# Patient Record
Sex: FEMALE | Race: WHITE | NOT HISPANIC OR LATINO
[De-identification: names, ages, dates, MRNs, and addresses within clinical notes are randomized per-mention and may not be internally consistent; named-entity substitution may affect disease eponyms.]

---

## 2017-05-23 ENCOUNTER — APPOINTMENT (OUTPATIENT)
Dept: ULTRASOUND IMAGING | Facility: CLINIC | Age: 72
End: 2017-05-23

## 2017-05-23 ENCOUNTER — OUTPATIENT (OUTPATIENT)
Dept: OUTPATIENT SERVICES | Facility: HOSPITAL | Age: 72
LOS: 1 days | End: 2017-05-23
Payer: MEDICARE

## 2017-05-23 ENCOUNTER — APPOINTMENT (OUTPATIENT)
Dept: MAMMOGRAPHY | Facility: CLINIC | Age: 72
End: 2017-05-23

## 2017-05-23 DIAGNOSIS — Z00.8 ENCOUNTER FOR OTHER GENERAL EXAMINATION: ICD-10-CM

## 2017-05-23 PROCEDURE — 76641 ULTRASOUND BREAST COMPLETE: CPT

## 2017-05-23 PROCEDURE — 77067 SCR MAMMO BI INCL CAD: CPT

## 2017-05-23 PROCEDURE — 77063 BREAST TOMOSYNTHESIS BI: CPT

## 2017-11-01 ENCOUNTER — APPOINTMENT (OUTPATIENT)
Dept: SURGERY | Facility: CLINIC | Age: 72
End: 2017-11-01
Payer: MEDICARE

## 2017-11-01 PROBLEM — Z00.00 ENCOUNTER FOR PREVENTIVE HEALTH EXAMINATION: Noted: 2017-11-01

## 2017-11-01 PROCEDURE — 99213K: CUSTOM

## 2018-05-17 ENCOUNTER — OUTPATIENT (OUTPATIENT)
Dept: OUTPATIENT SERVICES | Facility: HOSPITAL | Age: 73
LOS: 1 days | End: 2018-05-17
Payer: MEDICARE

## 2018-05-17 ENCOUNTER — APPOINTMENT (OUTPATIENT)
Dept: ULTRASOUND IMAGING | Facility: CLINIC | Age: 73
End: 2018-05-17
Payer: MEDICARE

## 2018-05-17 ENCOUNTER — APPOINTMENT (OUTPATIENT)
Dept: MAMMOGRAPHY | Facility: CLINIC | Age: 73
End: 2018-05-17
Payer: MEDICARE

## 2018-05-17 DIAGNOSIS — Z00.8 ENCOUNTER FOR OTHER GENERAL EXAMINATION: ICD-10-CM

## 2018-05-17 PROCEDURE — 77066 DX MAMMO INCL CAD BI: CPT | Mod: 26

## 2018-05-17 PROCEDURE — G0279: CPT | Mod: 26

## 2018-05-17 PROCEDURE — 76641 ULTRASOUND BREAST COMPLETE: CPT | Mod: 26,50

## 2018-05-17 PROCEDURE — 77066 DX MAMMO INCL CAD BI: CPT

## 2018-05-17 PROCEDURE — G0279: CPT

## 2018-05-17 PROCEDURE — 76641 ULTRASOUND BREAST COMPLETE: CPT

## 2018-10-29 ENCOUNTER — APPOINTMENT (OUTPATIENT)
Dept: SURGERY | Facility: CLINIC | Age: 73
End: 2018-10-29
Payer: MEDICARE

## 2018-10-29 PROCEDURE — 99213K: CUSTOM

## 2018-12-05 ENCOUNTER — APPOINTMENT (OUTPATIENT)
Dept: ULTRASOUND IMAGING | Facility: HOSPITAL | Age: 73
End: 2018-12-05

## 2018-12-06 ENCOUNTER — APPOINTMENT (OUTPATIENT)
Dept: ULTRASOUND IMAGING | Facility: HOSPITAL | Age: 73
End: 2018-12-06

## 2018-12-27 ENCOUNTER — APPOINTMENT (OUTPATIENT)
Dept: CT IMAGING | Facility: HOSPITAL | Age: 73
End: 2018-12-27

## 2018-12-27 ENCOUNTER — APPOINTMENT (OUTPATIENT)
Dept: ULTRASOUND IMAGING | Facility: HOSPITAL | Age: 73
End: 2018-12-27

## 2018-12-27 ENCOUNTER — OUTPATIENT (OUTPATIENT)
Dept: OUTPATIENT SERVICES | Facility: HOSPITAL | Age: 73
LOS: 1 days | Discharge: HOME | End: 2018-12-27
Payer: MEDICARE

## 2018-12-27 DIAGNOSIS — R94.5 ABNORMAL RESULTS OF LIVER FUNCTION STUDIES: ICD-10-CM

## 2018-12-27 LAB
ALBUMIN SERPL ELPH-MCNC: 4.1 G/DL — SIGNIFICANT CHANGE UP (ref 3.3–5)
ALP SERPL-CCNC: 162 U/L — HIGH (ref 40–120)
ALT FLD-CCNC: 30 U/L — SIGNIFICANT CHANGE UP (ref 12–78)
AST SERPL-CCNC: 21 U/L — SIGNIFICANT CHANGE UP (ref 15–37)
BILIRUB DIRECT SERPL-MCNC: 0.13 MG/DL — SIGNIFICANT CHANGE UP (ref 0.05–0.2)
BILIRUB INDIRECT FLD-MCNC: 0.2 MG/DL — SIGNIFICANT CHANGE UP (ref 0.2–1)
BILIRUB SERPL-MCNC: 0.3 MG/DL — SIGNIFICANT CHANGE UP (ref 0.2–1.2)
FERRITIN SERPL-MCNC: 127 NG/ML — SIGNIFICANT CHANGE UP (ref 15–150)
GGT SERPL-CCNC: 198 U/L — HIGH (ref 8–40)
HAV IGG SER QL IA: SIGNIFICANT CHANGE UP
HAV IGM SER-ACNC: SIGNIFICANT CHANGE UP
HBV E AB SER-ACNC: NEGATIVE — SIGNIFICANT CHANGE UP
HBV E AG SER-ACNC: NEGATIVE — SIGNIFICANT CHANGE UP
INR BLD: 1.02 RATIO — SIGNIFICANT CHANGE UP (ref 0.88–1.16)
IRON SATN MFR SERPL: 57 UG/DL — SIGNIFICANT CHANGE UP (ref 30–160)
PROT SERPL-MCNC: 7.4 GM/DL — SIGNIFICANT CHANGE UP (ref 6–8.3)
PROTHROM AB SERPL-ACNC: 11.4 SEC — SIGNIFICANT CHANGE UP (ref 10–12.9)

## 2018-12-27 PROCEDURE — 74176 CT ABD & PELVIS W/O CONTRAST: CPT | Mod: 26

## 2018-12-27 PROCEDURE — 76700 US EXAM ABDOM COMPLETE: CPT | Mod: 26

## 2018-12-28 LAB
ANA TITR SER: NEGATIVE — SIGNIFICANT CHANGE UP
HCV AB S/CO SERPL IA: 0.03 S/CO — SIGNIFICANT CHANGE UP
HCV AB SERPL-IMP: SIGNIFICANT CHANGE UP
IRON SATN MFR SERPL: 15 % — SIGNIFICANT CHANGE UP (ref 14–50)
IRON SATN MFR SERPL: 55 UG/DL — SIGNIFICANT CHANGE UP (ref 30–160)
MITOCHONDRIA AB SER-ACNC: SIGNIFICANT CHANGE UP
TIBC SERPL-MCNC: 374 UG/DL — SIGNIFICANT CHANGE UP (ref 220–430)
UIBC SERPL-MCNC: 319 UG/DL — SIGNIFICANT CHANGE UP (ref 110–370)

## 2018-12-31 LAB — SMA INTERPRETATION: NEGATIVE — SIGNIFICANT CHANGE UP

## 2019-01-03 LAB — SMOOTH MUSCLE AB SER-ACNC: SIGNIFICANT CHANGE UP

## 2019-04-08 ENCOUNTER — APPOINTMENT (OUTPATIENT)
Dept: CT IMAGING | Facility: HOSPITAL | Age: 74
End: 2019-04-08

## 2019-04-08 ENCOUNTER — OUTPATIENT (OUTPATIENT)
Dept: OUTPATIENT SERVICES | Facility: HOSPITAL | Age: 74
LOS: 1 days | Discharge: HOME | End: 2019-04-08
Payer: MEDICARE

## 2019-04-08 DIAGNOSIS — R19.00 INTRA-ABDOMINAL AND PELVIC SWELLING, MASS AND LUMP, UNSPECIFIED SITE: ICD-10-CM

## 2019-04-08 DIAGNOSIS — R70.0 ELEVATED ERYTHROCYTE SEDIMENTATION RATE: ICD-10-CM

## 2019-04-08 DIAGNOSIS — R79.82 ELEVATED C-REACTIVE PROTEIN (CRP): ICD-10-CM

## 2019-04-08 LAB
CRP SERPL-MCNC: 0.17 MG/DL — SIGNIFICANT CHANGE UP (ref 0–0.4)
ERYTHROCYTE [SEDIMENTATION RATE] IN BLOOD: 10 MM/HR — SIGNIFICANT CHANGE UP (ref 0–20)

## 2019-04-08 PROCEDURE — 76377 3D RENDER W/INTRP POSTPROCES: CPT | Mod: 26

## 2019-04-08 PROCEDURE — 72192 CT PELVIS W/O DYE: CPT | Mod: 26

## 2019-05-21 ENCOUNTER — OUTPATIENT (OUTPATIENT)
Dept: OUTPATIENT SERVICES | Facility: HOSPITAL | Age: 74
LOS: 1 days | Discharge: HOME | End: 2019-05-21
Payer: MEDICARE

## 2019-05-21 VITALS
HEART RATE: 99 BPM | OXYGEN SATURATION: 97 % | SYSTOLIC BLOOD PRESSURE: 141 MMHG | RESPIRATION RATE: 18 BRPM | WEIGHT: 178.57 LBS | TEMPERATURE: 98 F | HEIGHT: 63 IN | DIASTOLIC BLOOD PRESSURE: 87 MMHG

## 2019-05-21 DIAGNOSIS — C50.919 MALIGNANT NEOPLASM OF UNSPECIFIED SITE OF UNSPECIFIED FEMALE BREAST: ICD-10-CM

## 2019-05-21 DIAGNOSIS — Z98.890 OTHER SPECIFIED POSTPROCEDURAL STATES: Chronic | ICD-10-CM

## 2019-05-21 DIAGNOSIS — M16.12 UNILATERAL PRIMARY OSTEOARTHRITIS, LEFT HIP: ICD-10-CM

## 2019-05-21 DIAGNOSIS — E66.9 OBESITY, UNSPECIFIED: ICD-10-CM

## 2019-05-21 DIAGNOSIS — Z01.818 ENCOUNTER FOR OTHER PREPROCEDURAL EXAMINATION: ICD-10-CM

## 2019-05-21 LAB
ANION GAP SERPL CALC-SCNC: 9 MMOL/L — SIGNIFICANT CHANGE UP (ref 5–17)
ANISOCYTOSIS BLD QL: SLIGHT — SIGNIFICANT CHANGE UP
APTT BLD: 28.1 SEC — SIGNIFICANT CHANGE UP (ref 27.5–36.3)
BASOPHILS # BLD AUTO: 0 K/UL — SIGNIFICANT CHANGE UP (ref 0–0.2)
BASOPHILS NFR BLD AUTO: 0 % — SIGNIFICANT CHANGE UP (ref 0–2)
BLD GP AB SCN SERPL QL: SIGNIFICANT CHANGE UP
BUN SERPL-MCNC: 29 MG/DL — HIGH (ref 7–23)
CALCIUM SERPL-MCNC: 9.9 MG/DL — SIGNIFICANT CHANGE UP (ref 8.5–10.1)
CHLORIDE SERPL-SCNC: 108 MMOL/L — SIGNIFICANT CHANGE UP (ref 96–108)
CO2 SERPL-SCNC: 24 MMOL/L — SIGNIFICANT CHANGE UP (ref 22–31)
CREAT SERPL-MCNC: 0.9 MG/DL — SIGNIFICANT CHANGE UP (ref 0.5–1.3)
ELLIPTOCYTES BLD QL SMEAR: SLIGHT — SIGNIFICANT CHANGE UP
EOSINOPHIL # BLD AUTO: 0 K/UL — SIGNIFICANT CHANGE UP (ref 0–0.5)
EOSINOPHIL NFR BLD AUTO: 0 % — SIGNIFICANT CHANGE UP (ref 0–6)
GLUCOSE SERPL-MCNC: 102 MG/DL — HIGH (ref 70–99)
HBA1C BLD-MCNC: 6 % — HIGH (ref 4–5.6)
HCT VFR BLD CALC: 39.7 % — SIGNIFICANT CHANGE UP (ref 34.5–45)
HGB BLD-MCNC: 12.5 G/DL — SIGNIFICANT CHANGE UP (ref 11.5–15.5)
INR BLD: 0.89 RATIO — SIGNIFICANT CHANGE UP (ref 0.88–1.16)
LYMPHOCYTES # BLD AUTO: 15 % — SIGNIFICANT CHANGE UP (ref 13–44)
LYMPHOCYTES # BLD AUTO: 2 K/UL — SIGNIFICANT CHANGE UP (ref 1–3.3)
MACROCYTES BLD QL: SLIGHT — SIGNIFICANT CHANGE UP
MANUAL SMEAR VERIFICATION: SIGNIFICANT CHANGE UP
MCHC RBC-ENTMCNC: 25.9 PG — LOW (ref 27–34)
MCHC RBC-ENTMCNC: 31.5 GM/DL — LOW (ref 32–36)
MCV RBC AUTO: 82.2 FL — SIGNIFICANT CHANGE UP (ref 80–100)
MICROCYTES BLD QL: SLIGHT — SIGNIFICANT CHANGE UP
MONOCYTES # BLD AUTO: 0.53 K/UL — SIGNIFICANT CHANGE UP (ref 0–0.9)
MONOCYTES NFR BLD AUTO: 4 % — SIGNIFICANT CHANGE UP (ref 2–14)
NEUTROPHILS # BLD AUTO: 10.78 K/UL — HIGH (ref 1.8–7.4)
NEUTROPHILS NFR BLD AUTO: 80 % — HIGH (ref 43–77)
NEUTS BAND # BLD: 1 % — SIGNIFICANT CHANGE UP (ref 0–8)
NRBC # BLD: 0 /100 — SIGNIFICANT CHANGE UP (ref 0–0)
NRBC # BLD: SIGNIFICANT CHANGE UP /100 WBCS (ref 0–0)
OVALOCYTES BLD QL SMEAR: SLIGHT — SIGNIFICANT CHANGE UP
PLAT MORPH BLD: NORMAL — SIGNIFICANT CHANGE UP
PLATELET # BLD AUTO: 312 K/UL — SIGNIFICANT CHANGE UP (ref 150–400)
POTASSIUM SERPL-MCNC: 4.5 MMOL/L — SIGNIFICANT CHANGE UP (ref 3.5–5.3)
POTASSIUM SERPL-SCNC: 4.5 MMOL/L — SIGNIFICANT CHANGE UP (ref 3.5–5.3)
PROTHROM AB SERPL-ACNC: 9.9 SEC — LOW (ref 10–12.9)
RBC # BLD: 4.83 M/UL — SIGNIFICANT CHANGE UP (ref 3.8–5.2)
RBC # FLD: 14.8 % — HIGH (ref 10.3–14.5)
RBC BLD AUTO: ABNORMAL
SODIUM SERPL-SCNC: 141 MMOL/L — SIGNIFICANT CHANGE UP (ref 135–145)
WBC # BLD: 13.31 K/UL — HIGH (ref 3.8–10.5)
WBC # FLD AUTO: 13.31 K/UL — HIGH (ref 3.8–10.5)

## 2019-05-21 PROCEDURE — 93010 ELECTROCARDIOGRAM REPORT: CPT

## 2019-05-21 NOTE — H&P PST ADULT - NSICDXPROBLEM_GEN_ALL_CORE_FT
PROBLEM DIAGNOSES  Problem: Unilateral primary osteoarthritis, left hip  Assessment and Plan: Left complex posterior total hip replacement  Pre-op instructions given by RN, patient verbalized understanding  Chlorhexidine wash instructions given   Pending: Medical clearance    Problem: Obesity  Assessment and Plan: MARIAM precautions    Problem: Breast cancer  Assessment and Plan: treated with radiation

## 2019-05-21 NOTE — H&P PST ADULT - NSICDXPASTSURGICALHX_GEN_ALL_CORE_FT
PAST SURGICAL HISTORY:  Benign Cyst of Skin Left Shoulder Excision     History of Right Breast Biopsy 2003    History of Total Hysterectomy with Removal of Both Tubes and Ovaries 2004    S/P Lumpectomy of Breast Left 2003    S/P Lumpectomy of Breast Left     S/P Lumpectomy of Breast Right 2004 & 2005    Status Post Breast Lumpectomy (ICD9 V45.89) left breast lumpectomy

## 2019-05-21 NOTE — OCCUPATIONAL THERAPY INITIAL EVALUATION ADULT - PATIENT/FAMILY/SIGNIFICANT OTHER GOALS STATEMENT, OT EVAL
Pt would like to be restored to prior level of function and receive rehab services at a facility post-operatively.

## 2019-05-21 NOTE — H&P PST ADULT - NSICDXFAMILYHX_GEN_ALL_CORE_FT
FAMILY HISTORY:  Father  Still living? No  Family history of brain cancer, Age at diagnosis: 41-50    Mother  Still living? Unknown  Family history of hypertension, Age at diagnosis: Age Unknown

## 2019-05-21 NOTE — OCCUPATIONAL THERAPY INITIAL EVALUATION ADULT - ANTICIPATED DISCHARGE DISPOSITION, OT EVAL
Recommend  STR post operatively,  3-in-1 commode, rolling walker  to enable patient to safely perform ADL management and functional mobility. Pt owns rollator and SAC; both are in good working conditions Recommend STR post operatively, 3-in-1 commode, rolling walker to enable patient to safely perform ADL management and functional mobility. Pt owns rollator and SAC; both are in good working conditions

## 2019-05-21 NOTE — H&P PST ADULT - ASSESSMENT
CAPRINI SCORE    AGE RELATED RISK FACTORS                                                       MOBILITY RELATED FACTORS  [ ] Age 41-60 years                                            (1 Point)                  [ ] Bed rest                                                        (1 Point)  [ ] Age: 61-74 years                                           (2 Points)                [ ] Plaster cast                                                   (2 Points)  [ ] Age= 75 years                                              (3 Points)                 [ ] Bed bound for more than 72 hours                   (2 Points)    DISEASE RELATED RISK FACTORS                                               GENDER SPECIFIC FACTORS  [ ] Edema in the lower extremities                       (1 Point)                  [ ] Pregnancy                                                     (1 Point)  [ ] Varicose veins                                               (1 Point)                  [ ] Post-partum < 6 weeks                                   (1 Point)             [ ] BMI > 25 Kg/m2                                            (1 Point)                  [ ] Hormonal therapy  or oral contraception            (1 Point)                 [ ] Sepsis (in the previous month)                        (1 Point)                  [ ] History of pregnancy complications  [ ] Pneumonia or serious lung disease                                               [ ] Unexplained or recurrent                       (1 Point)           (in the previous month)                               (1 Point)  [ ] Abnormal pulmonary function test                     (1 Point)                 SURGERY RELATED RISK FACTORS  [ ] Acute myocardial infarction                              (1 Point)                 [ ]  Section                                            (1 Point)  [ ] Congestive heart failure (in the previous month)  (1 Point)                 [ ] Minor surgery                                                 (1 Point)   [ ] Inflammatory bowel disease                             (1 Point)                 [ ] Arthroscopic surgery                                        (2 Points)  [ ] Central venous access                                    (2 Points)                [ ] General surgery lasting more than 45 minutes   (2 Points)       [ ] Stroke (in the previous month)                          (5 Points)               [ ] Elective arthroplasty                                        (5 Points)                                                                                                                                               HEMATOLOGY RELATED FACTORS                                                 TRAUMA RELATED RISK FACTORS  [ ] Prior episodes of VTE                                     (3 Points)                 [ ] Fracture of the hip, pelvis, or leg                       (5 Points)  [ ] Positive family history for VTE                         (3 Points)                 [ ] Acute spinal cord injury (in the previous month)  (5 Points)  [ ] Prothrombin 82636 A                                      (3 Points)                 [ ] Paralysis  (less than 1 month)                          (5 Points)  [ ] Factor V Leiden                                             (3 Points)                 [ ] Multiple Trauma within 1 month                         (5 Points)  [ ] Lupus anticoagulants                                     (3 Points)                                                           [ ] Anticardiolipin antibodies                                (3 Points)                                                       [ ] High homocysteine in the blood                      (3 Points)                                             [ ] Other congenital or acquired thrombophilia       (3 Points)                                                [ ] Heparin induced thrombocytopenia                  (3 Points)                                          Total Score [          ] 73F [facility] b/l breast cancer (treated with Radiation), c/o left hip pain 2/2 unilateral primary osteoarthritis here for PST for scheduled Left complex posterior total hip replacement  CAPRINI SCORE    AGE RELATED RISK FACTORS                                                       MOBILITY RELATED FACTORS  [ ] Age 41-60 years                                            (1 Point)                  [ ] Bed rest                                                        (1 Point)  [ x] Age: 61-74 years                                           (2 Points)                [ ] Plaster cast                                                   (2 Points)  [ ] Age= 75 years                                              (3 Points)                 [ ] Bed bound for more than 72 hours                   (2 Points)    DISEASE RELATED RISK FACTORS                                               GENDER SPECIFIC FACTORS  [x ] Edema in the lower extremities                       (1 Point)                  [ ] Pregnancy                                                     (1 Point)  [ ] Varicose veins                                               (1 Point)                  [ ] Post-partum < 6 weeks                                   (1 Point)             [x ] BMI > 25 Kg/m2                                            (1 Point)                  [ ] Hormonal therapy  or oral contraception            (1 Point)                 [ ] Sepsis (in the previous month)                        (1 Point)                  [ ] History of pregnancy complications  [ ] Pneumonia or serious lung disease                                               [ ] Unexplained or recurrent                       (1 Point)           (in the previous month)                               (1 Point)  [ ] Abnormal pulmonary function test                     (1 Point)                 SURGERY RELATED RISK FACTORS  [ ] Acute myocardial infarction                              (1 Point)                 [ ]  Section                                            (1 Point)  [ ] Congestive heart failure (in the previous month)  (1 Point)                 [ ] Minor surgery                                                 (1 Point)   [ ] Inflammatory bowel disease                             (1 Point)                 [ ] Arthroscopic surgery                                        (2 Points)  [ ] Central venous access                                    (2 Points)                [x ] General surgery lasting more than 45 minutes   (2 Points)       [ ] Stroke (in the previous month)                          (5 Points)               [ ] Elective arthroplasty                                        (5 Points)                                                                                                                                               HEMATOLOGY RELATED FACTORS                                                 TRAUMA RELATED RISK FACTORS  [ ] Prior episodes of VTE                                     (3 Points)                 [ ] Fracture of the hip, pelvis, or leg                       (5 Points)  [ ] Positive family history for VTE                         (3 Points)                 [ ] Acute spinal cord injury (in the previous month)  (5 Points)  [ ] Prothrombin 27914 A                                      (3 Points)                 [ ] Paralysis  (less than 1 month)                          (5 Points)  [ ] Factor V Leiden                                             (3 Points)                 [ ] Multiple Trauma within 1 month                         (5 Points)  [ ] Lupus anticoagulants                                     (3 Points)                                                           [ ] Anticardiolipin antibodies                                (3 Points)                                                       [ ] High homocysteine in the blood                      (3 Points)                                             [ ] Other congenital or acquired thrombophilia       (3 Points)                                                [ ] Heparin induced thrombocytopenia                  (3 Points)                                          Total Score [  9        ]

## 2019-05-21 NOTE — PHYSICAL THERAPY INITIAL EVALUATION ADULT - RANGE OF MOTION EXAMINATION, REHAB EVAL
bilateral upper extremity ROM was WNL (within normal limits)/deficits as listed below/bilateral lower extremity was ROM was WNL (within normal limits)/except L hip limited due to pain.

## 2019-05-21 NOTE — PHYSICAL THERAPY INITIAL EVALUATION ADULT - ADDITIONAL COMMENTS
Pt lives alone (nobody can provide assist upon D/C home) in a private home, 1 entry step (no rail), 2 steps c B/L rails inside home.  Pt has a tub/shower combo with a retractable shower head, standard toilet seat height, & no grab bar. Pt states she is currently independent with all functional mobility including household ambulation c straight cane, and community ambulation with rollator. Pt states she is independent with ADL's as well. Pt is right hand dominant, wears eye glasses, doesn't drive, & is retired. Pt reports daily 0/10 pain & states it is worse with any activity. Pt reports she has the most difficulty time "getting up & moving around" after prolonged sitting. Pt endorses taking narcotics for pain management. Goal of therapy: manage pain & improve functional mobility.

## 2019-05-21 NOTE — PHYSICAL THERAPY INITIAL EVALUATION ADULT - ACTIVE RANGE OF MOTION EXAMINATION, REHAB EVAL
valeriy. upper extremity Active ROM was WNL (within normal limits)/except L hip limited due to pain/deficits as listed below/bilateral lower extremity Active ROM was WNL (within normal limits)

## 2019-05-21 NOTE — OCCUPATIONAL THERAPY INITIAL EVALUATION ADULT - PERTINENT HX OF CURRENT PROBLEM, REHAB EVAL
Pt is slated for elective surgery for posterior left THR at a later date with MD Snider due to OA, chronic pain and DJD. Pt reported  no fall in the past  6 months . Pt is slated for elective surgery for posterior left THR at a later date with MD Snider due to OA, chronic pain and DJD. Pt reported no falls in the past 3- 6 months .

## 2019-05-21 NOTE — PATIENT PROFILE ADULT - VISION (WITH CORRECTIVE LENSES IF THE PATIENT USUALLY WEARS THEM):
wear glasses for reading/Normal vision: sees adequately in most situations; can see medication labels, newsprint

## 2019-05-21 NOTE — H&P PST ADULT - NSICDXPASTMEDICALHX_GEN_ALL_CORE_FT
PAST MEDICAL HISTORY:  Breast Cancer (ICD9 174.9)     Breast Cancer Left     Breast Cancer Right     Obesity     Osteoarthritis     Radiation Therapy (ICD9 V58.0) mammosite/balloon left breast for radiation treatment    Uterine cancer h/o

## 2019-05-21 NOTE — OCCUPATIONAL THERAPY INITIAL EVALUATION ADULT - PRECAUTIONS/LIMITATIONS, REHAB EVAL
Pt has a history of multiple comorbidities and diminished reaction time due to age related changes . NO BP om left arm due to history of breat cancer. Pt has a history of multiple comorbidities and diminished reaction time due to age related changes . NO BP on left arm due to history of breat cancer.

## 2019-05-21 NOTE — OCCUPATIONAL THERAPY INITIAL EVALUATION ADULT - RANGE OF MOTION EXAMINATION, LOWER EXTREMITY
bilateral LE Active Assistive ROM was WFL  (within functional limits)/ROM in both hips and knee is diminished/bilateral LE Passive ROM was WFL  (within functional limits)

## 2019-05-21 NOTE — H&P PST ADULT - HISTORY OF PRESENT ILLNESS
73F [facility] ---- c/o left hip pain----- 73F [facility] b/l breast cancer (treated with Radiation), c/o left hip pain 2/2 unilateral primary osteoarthritis here for PST for scheduled Left complex posterior total hip replacement

## 2019-05-21 NOTE — OCCUPATIONAL THERAPY INITIAL EVALUATION ADULT - TRANSFER SAFETY CONCERNS NOTED: SIT/STAND, REHAB EVAL
squat pivot/stepping too close to front of assistive device/decreased weight-shifting ability/stand pivot/decreased step length

## 2019-05-21 NOTE — OCCUPATIONAL THERAPY INITIAL EVALUATION ADULT - SOCIAL CONCERNS
Pt voiced concerns about her recovery at home lack of support and multiple health problems./Complex psychosocial needs/coping issues

## 2019-05-21 NOTE — OCCUPATIONAL THERAPY INITIAL EVALUATION ADULT - LIVES WITH, PROFILE
alone/in a private house with 1 step to enter without rails and 2 steps inside with close bilateral hand rail. All living amenities are located on one level. The bathroom has a tub/shower combination, tub bench , fixed /retractable shower head and standard toilet. Pt's toilet has adequate space to fit a commode over it. alone/in a private house with 1 step to enter without rails and 2 steps inside with close bilateral hand rails. All living amenities are located on one level. The bathroom has a tub/shower combination, tub bench, fixed /retractable shower head and standard toilet. Pt's toilet has adequate space to fit a commode over it.

## 2019-05-21 NOTE — PHYSICAL THERAPY INITIAL EVALUATION ADULT - GAIT DEVIATIONS NOTED, PT EVAL
decreased stride length/increased time in double stance/decreased alec/decreased step length/increased stride width

## 2019-05-21 NOTE — PHYSICAL THERAPY INITIAL EVALUATION ADULT - CRITERIA FOR SKILLED THERAPEUTIC INTERVENTIONS
risk reduction/prevention/rehab potential/functional limitations in following categories/therapy frequency/impairments found

## 2019-05-21 NOTE — PHYSICAL THERAPY INITIAL EVALUATION ADULT - GENERAL OBSERVATIONS, REHAB EVAL
Chart reviewed. Patient seen seated in recliner chair in [facility] waiting area with no apparent distress. [place]. Patient underwent pre-operative consultation to determine current functional mobility limitations to determine appropriate need for assistive devices.

## 2019-05-21 NOTE — OCCUPATIONAL THERAPY INITIAL EVALUATION ADULT - ADDITIONAL COMMENTS
Pt is functioning in her roles, self sufficient, & ambulating independently in the community with a single axis cane. Pt does not drive; she uses ambulette services to get to her appointment. Pt is  [place] right hand dominant and wears glasses for reading. Pt c/o 2/10 pain in her right knee /left hip ; this intensifies  with changes in the weather , prolonged sitting , standing walking and it impacts ADL management; pt take ibuprofen  PRN for pain relief. Pt scores 80% of patient specific scale Pt is functioning in her roles, self sufficient, & ambulating independently in the community with a single axis cane. Pt does not drive; she uses ambulette services to get to her appointments. Pt is [place],  right hand dominant and wears glasses for reading. Pt c/o 2/10 pain in her right knee/left hip ; this intensifies with changes in the weather, prolonged sitting, standing walking and it impacts ADL management; pt takes ibuprofen PRN for pain relief. Pt scores 80% of patient specific scale.

## 2019-05-21 NOTE — PHYSICAL THERAPY INITIAL EVALUATION ADULT - PERTINENT HX OF CURRENT PROBLEM, REHAB EVAL
Patient attends pre-op testing today following consult c Dr. Snider due to chronic pain to L hip. Elective L ANGEL LUIS  is now scheduled in this facility for 6/4/2019.

## 2019-05-21 NOTE — H&P PST ADULT - NSANTHOSAYNRD_GEN_A_CORE
No. MARIAM screening performed.  STOP BANG Legend: 0-2 = LOW Risk; 3-4 = INTERMEDIATE Risk; 5-8 = HIGH Risk

## 2019-05-22 LAB
MRSA PCR RESULT.: SIGNIFICANT CHANGE UP
S AUREUS DNA NOSE QL NAA+PROBE: SIGNIFICANT CHANGE UP

## 2019-05-28 ENCOUNTER — APPOINTMENT (OUTPATIENT)
Dept: MAMMOGRAPHY | Facility: CLINIC | Age: 74
End: 2019-05-28
Payer: MEDICARE

## 2019-05-28 ENCOUNTER — APPOINTMENT (OUTPATIENT)
Dept: ULTRASOUND IMAGING | Facility: CLINIC | Age: 74
End: 2019-05-28
Payer: MEDICARE

## 2019-05-28 ENCOUNTER — OUTPATIENT (OUTPATIENT)
Dept: OUTPATIENT SERVICES | Facility: HOSPITAL | Age: 74
LOS: 1 days | End: 2019-05-28
Payer: MEDICARE

## 2019-05-28 DIAGNOSIS — Z00.8 ENCOUNTER FOR OTHER GENERAL EXAMINATION: ICD-10-CM

## 2019-05-28 PROCEDURE — 76641 ULTRASOUND BREAST COMPLETE: CPT | Mod: 26,50

## 2019-05-28 PROCEDURE — 77063 BREAST TOMOSYNTHESIS BI: CPT

## 2019-05-28 PROCEDURE — 76641 ULTRASOUND BREAST COMPLETE: CPT

## 2019-05-28 PROCEDURE — 77063 BREAST TOMOSYNTHESIS BI: CPT | Mod: 26

## 2019-05-28 PROCEDURE — 77067 SCR MAMMO BI INCL CAD: CPT | Mod: 26

## 2019-05-28 PROCEDURE — 77067 SCR MAMMO BI INCL CAD: CPT

## 2019-05-31 ENCOUNTER — RESULT REVIEW (OUTPATIENT)
Age: 74
End: 2019-05-31

## 2019-05-31 ENCOUNTER — APPOINTMENT (OUTPATIENT)
Dept: ULTRASOUND IMAGING | Facility: CLINIC | Age: 74
End: 2019-05-31
Payer: MEDICARE

## 2019-05-31 ENCOUNTER — OUTPATIENT (OUTPATIENT)
Dept: OUTPATIENT SERVICES | Facility: HOSPITAL | Age: 74
LOS: 1 days | End: 2019-05-31
Payer: MEDICARE

## 2019-05-31 DIAGNOSIS — N63.20 UNSPECIFIED LUMP IN THE LEFT BREAST, UNSPECIFIED QUADRANT: ICD-10-CM

## 2019-05-31 PROCEDURE — 77065 DX MAMMO INCL CAD UNI: CPT | Mod: 26,LT

## 2019-05-31 PROCEDURE — 77065 DX MAMMO INCL CAD UNI: CPT

## 2019-05-31 PROCEDURE — 88305 TISSUE EXAM BY PATHOLOGIST: CPT | Mod: 26

## 2019-05-31 PROCEDURE — 88360 TUMOR IMMUNOHISTOCHEM/MANUAL: CPT | Mod: 26

## 2019-05-31 PROCEDURE — 19083 BX BREAST 1ST LESION US IMAG: CPT | Mod: LT

## 2019-05-31 PROCEDURE — A4648: CPT

## 2019-05-31 PROCEDURE — C1739: CPT

## 2019-05-31 PROCEDURE — 19083 BX BREAST 1ST LESION US IMAG: CPT

## 2019-06-03 ENCOUNTER — TRANSCRIPTION ENCOUNTER (OUTPATIENT)
Age: 74
End: 2019-06-03

## 2019-06-04 ENCOUNTER — TRANSCRIPTION ENCOUNTER (OUTPATIENT)
Age: 74
End: 2019-06-04

## 2019-06-04 ENCOUNTER — RESULT REVIEW (OUTPATIENT)
Age: 74
End: 2019-06-04

## 2019-06-04 ENCOUNTER — INPATIENT (INPATIENT)
Facility: HOSPITAL | Age: 74
LOS: 0 days | Discharge: NURSING, REHAB OR LONG-TERM CARE | End: 2019-06-05
Attending: ORTHOPAEDIC SURGERY | Admitting: ORTHOPAEDIC SURGERY
Payer: MEDICARE

## 2019-06-04 VITALS
SYSTOLIC BLOOD PRESSURE: 144 MMHG | TEMPERATURE: 98 F | HEIGHT: 63 IN | RESPIRATION RATE: 14 BRPM | HEART RATE: 100 BPM | OXYGEN SATURATION: 100 % | WEIGHT: 178.57 LBS | DIASTOLIC BLOOD PRESSURE: 68 MMHG

## 2019-06-04 LAB
BLD GP AB SCN SERPL QL: SIGNIFICANT CHANGE UP
HCT VFR BLD CALC: 36.4 % — SIGNIFICANT CHANGE UP (ref 34.5–45)
HGB BLD-MCNC: 11.5 G/DL — SIGNIFICANT CHANGE UP (ref 11.5–15.5)
MCHC RBC-ENTMCNC: 26.3 PG — LOW (ref 27–34)
MCHC RBC-ENTMCNC: 31.6 GM/DL — LOW (ref 32–36)
MCV RBC AUTO: 83.3 FL — SIGNIFICANT CHANGE UP (ref 80–100)
NRBC # BLD: 0 /100 WBCS — SIGNIFICANT CHANGE UP (ref 0–0)
PLATELET # BLD AUTO: 255 K/UL — SIGNIFICANT CHANGE UP (ref 150–400)
RBC # BLD: 4.37 M/UL — SIGNIFICANT CHANGE UP (ref 3.8–5.2)
RBC # FLD: 14.5 % — SIGNIFICANT CHANGE UP (ref 10.3–14.5)
WBC # BLD: 18.62 K/UL — HIGH (ref 3.8–10.5)
WBC # FLD AUTO: 18.62 K/UL — HIGH (ref 3.8–10.5)

## 2019-06-04 PROCEDURE — 88311 DECALCIFY TISSUE: CPT | Mod: 26

## 2019-06-04 PROCEDURE — 88305 TISSUE EXAM BY PATHOLOGIST: CPT | Mod: 26

## 2019-06-04 PROCEDURE — 72170 X-RAY EXAM OF PELVIS: CPT | Mod: 26

## 2019-06-04 RX ORDER — FOLIC ACID 0.8 MG
1 TABLET ORAL DAILY
Refills: 0 | Status: DISCONTINUED | OUTPATIENT
Start: 2019-06-04 | End: 2019-06-05

## 2019-06-04 RX ORDER — SODIUM CHLORIDE 9 MG/ML
3 INJECTION INTRAMUSCULAR; INTRAVENOUS; SUBCUTANEOUS EVERY 8 HOURS
Refills: 0 | Status: DISCONTINUED | OUTPATIENT
Start: 2019-06-04 | End: 2019-06-04

## 2019-06-04 RX ORDER — DOCUSATE SODIUM 100 MG
100 CAPSULE ORAL THREE TIMES A DAY
Refills: 0 | Status: DISCONTINUED | OUTPATIENT
Start: 2019-06-04 | End: 2019-06-05

## 2019-06-04 RX ORDER — ONDANSETRON 8 MG/1
4 TABLET, FILM COATED ORAL EVERY 6 HOURS
Refills: 0 | Status: DISCONTINUED | OUTPATIENT
Start: 2019-06-04 | End: 2019-06-05

## 2019-06-04 RX ORDER — SENNA PLUS 8.6 MG/1
2 TABLET ORAL AT BEDTIME
Refills: 0 | Status: DISCONTINUED | OUTPATIENT
Start: 2019-06-04 | End: 2019-06-05

## 2019-06-04 RX ORDER — OXYCODONE HYDROCHLORIDE 5 MG/1
10 TABLET ORAL ONCE
Refills: 0 | Status: DISCONTINUED | OUTPATIENT
Start: 2019-06-04 | End: 2019-06-04

## 2019-06-04 RX ORDER — HYDROMORPHONE HYDROCHLORIDE 2 MG/ML
1 INJECTION INTRAMUSCULAR; INTRAVENOUS; SUBCUTANEOUS
Refills: 0 | Status: DISCONTINUED | OUTPATIENT
Start: 2019-06-04 | End: 2019-06-04

## 2019-06-04 RX ORDER — CEFAZOLIN SODIUM 1 G
2000 VIAL (EA) INJECTION EVERY 8 HOURS
Refills: 0 | Status: COMPLETED | OUTPATIENT
Start: 2019-06-04 | End: 2019-06-05

## 2019-06-04 RX ORDER — CELECOXIB 200 MG/1
200 CAPSULE ORAL DAILY
Refills: 0 | Status: DISCONTINUED | OUTPATIENT
Start: 2019-06-06 | End: 2019-06-05

## 2019-06-04 RX ORDER — FERROUS SULFATE 325(65) MG
325 TABLET ORAL
Refills: 0 | Status: DISCONTINUED | OUTPATIENT
Start: 2019-06-04 | End: 2019-06-05

## 2019-06-04 RX ORDER — PANTOPRAZOLE SODIUM 20 MG/1
40 TABLET, DELAYED RELEASE ORAL
Refills: 0 | Status: DISCONTINUED | OUTPATIENT
Start: 2019-06-04 | End: 2019-06-05

## 2019-06-04 RX ORDER — ACETAMINOPHEN 500 MG
975 TABLET ORAL EVERY 8 HOURS
Refills: 0 | Status: DISCONTINUED | OUTPATIENT
Start: 2019-06-04 | End: 2019-06-05

## 2019-06-04 RX ORDER — MAGNESIUM HYDROXIDE 400 MG/1
30 TABLET, CHEWABLE ORAL DAILY
Refills: 0 | Status: DISCONTINUED | OUTPATIENT
Start: 2019-06-04 | End: 2019-06-05

## 2019-06-04 RX ORDER — ASPIRIN/CALCIUM CARB/MAGNESIUM 324 MG
325 TABLET ORAL
Refills: 0 | Status: DISCONTINUED | OUTPATIENT
Start: 2019-06-05 | End: 2019-06-05

## 2019-06-04 RX ORDER — OXYCODONE HYDROCHLORIDE 5 MG/1
5 TABLET ORAL EVERY 4 HOURS
Refills: 0 | Status: DISCONTINUED | OUTPATIENT
Start: 2019-06-04 | End: 2019-06-05

## 2019-06-04 RX ORDER — CELECOXIB 200 MG/1
200 CAPSULE ORAL ONCE
Refills: 0 | Status: COMPLETED | OUTPATIENT
Start: 2019-06-04 | End: 2019-06-04

## 2019-06-04 RX ORDER — ACETAMINOPHEN 500 MG
650 TABLET ORAL ONCE
Refills: 0 | Status: COMPLETED | OUTPATIENT
Start: 2019-06-04 | End: 2019-06-04

## 2019-06-04 RX ORDER — HYDROMORPHONE HYDROCHLORIDE 2 MG/ML
0.5 INJECTION INTRAMUSCULAR; INTRAVENOUS; SUBCUTANEOUS
Refills: 0 | Status: DISCONTINUED | OUTPATIENT
Start: 2019-06-04 | End: 2019-06-04

## 2019-06-04 RX ORDER — DEXAMETHASONE 0.5 MG/5ML
10 ELIXIR ORAL ONCE
Refills: 0 | Status: COMPLETED | OUTPATIENT
Start: 2019-06-05 | End: 2019-06-05

## 2019-06-04 RX ORDER — OXYCODONE HYDROCHLORIDE 5 MG/1
10 TABLET ORAL EVERY 4 HOURS
Refills: 0 | Status: DISCONTINUED | OUTPATIENT
Start: 2019-06-04 | End: 2019-06-05

## 2019-06-04 RX ORDER — SODIUM CHLORIDE 9 MG/ML
1000 INJECTION, SOLUTION INTRAVENOUS
Refills: 0 | Status: DISCONTINUED | OUTPATIENT
Start: 2019-06-04 | End: 2019-06-04

## 2019-06-04 RX ORDER — HYDROMORPHONE HYDROCHLORIDE 2 MG/ML
0.5 INJECTION INTRAMUSCULAR; INTRAVENOUS; SUBCUTANEOUS EVERY 4 HOURS
Refills: 0 | Status: DISCONTINUED | OUTPATIENT
Start: 2019-06-04 | End: 2019-06-05

## 2019-06-04 RX ORDER — POLYETHYLENE GLYCOL 3350 17 G/17G
17 POWDER, FOR SOLUTION ORAL DAILY
Refills: 0 | Status: DISCONTINUED | OUTPATIENT
Start: 2019-06-04 | End: 2019-06-05

## 2019-06-04 RX ORDER — SODIUM CHLORIDE 9 MG/ML
1000 INJECTION INTRAMUSCULAR; INTRAVENOUS; SUBCUTANEOUS
Refills: 0 | Status: DISCONTINUED | OUTPATIENT
Start: 2019-06-04 | End: 2019-06-05

## 2019-06-04 RX ORDER — ONDANSETRON 8 MG/1
4 TABLET, FILM COATED ORAL ONCE
Refills: 0 | Status: DISCONTINUED | OUTPATIENT
Start: 2019-06-04 | End: 2019-06-04

## 2019-06-04 RX ADMIN — SODIUM CHLORIDE 75 MILLILITER(S): 9 INJECTION INTRAMUSCULAR; INTRAVENOUS; SUBCUTANEOUS at 20:07

## 2019-06-04 RX ADMIN — Medication 100 MILLIGRAM(S): at 22:02

## 2019-06-04 RX ADMIN — SODIUM CHLORIDE 100 MILLILITER(S): 9 INJECTION, SOLUTION INTRAVENOUS at 15:45

## 2019-06-04 RX ADMIN — OXYCODONE HYDROCHLORIDE 10 MILLIGRAM(S): 5 TABLET ORAL at 20:08

## 2019-06-04 RX ADMIN — HYDROMORPHONE HYDROCHLORIDE 0.5 MILLIGRAM(S): 2 INJECTION INTRAMUSCULAR; INTRAVENOUS; SUBCUTANEOUS at 16:32

## 2019-06-04 RX ADMIN — SODIUM CHLORIDE 3 MILLILITER(S): 9 INJECTION INTRAMUSCULAR; INTRAVENOUS; SUBCUTANEOUS at 10:20

## 2019-06-04 RX ADMIN — Medication 100 MILLIGRAM(S): at 21:35

## 2019-06-04 RX ADMIN — HYDROMORPHONE HYDROCHLORIDE 0.5 MILLIGRAM(S): 2 INJECTION INTRAMUSCULAR; INTRAVENOUS; SUBCUTANEOUS at 16:13

## 2019-06-04 RX ADMIN — Medication 650 MILLIGRAM(S): at 09:46

## 2019-06-04 RX ADMIN — CELECOXIB 200 MILLIGRAM(S): 200 CAPSULE ORAL at 09:46

## 2019-06-04 RX ADMIN — OXYCODONE HYDROCHLORIDE 10 MILLIGRAM(S): 5 TABLET ORAL at 09:47

## 2019-06-04 RX ADMIN — OXYCODONE HYDROCHLORIDE 10 MILLIGRAM(S): 5 TABLET ORAL at 21:00

## 2019-06-04 NOTE — PHYSICAL THERAPY INITIAL EVALUATION ADULT - ACTIVE RANGE OF MOTION EXAMINATION, REHAB EVAL
no Active ROM deficits were identified/AAROm of left hip flexiion to 70 degrees grossly graded, all other joints are WFL

## 2019-06-04 NOTE — DISCHARGE NOTE PROVIDER - NSDCFUADDAPPT_GEN_ALL_CORE_FT
Follow up with Dr. Snider in 2 weeks. Please call [phone] for appointment.      Follow up with your primary care doctor within 1 week to monitor your blood work. Please call to make an appointment.    Please call your surgeon, if you have new onset of fevers, increased drainage, increased pain or increased redness around the incision site. Please return to the Emergency Department if you have chest pain or shortness of breath. Follow up with Dr. Snider in 2 weeks. Please call [phone] for appointment.    If you have a question about your medicine, if you feel "off" or are having a "bad day," or if you need to see or speak with a doctor or if you need emergency services, please call our Orthopedic Navigator Help Line at [phone]. Your  is Geno Ruelas NP. You can call 24/7 - there will be a medical provider available to help you. Follow up with Dr. Snider in 2 weeks. Please call [phone] for appointment.    If you have a question about your medicine, if you feel "off" or are having a "bad day," or if you need to see or speak with a doctor or if you need emergency services, please call our Orthopedic Navigator Help Line at [phone]. Your  is Geno Ruelas NP. You can call 24/7 - there will be a medical provider available to help you.    Straight cath every 8 hours as needed for residual urine. Follow up with Dr. Snider in 2 weeks. Please call [phone] for appointment.    If you have a question about your medicine, if you feel "off" or are having a "bad day," or if you need to see or speak with a doctor or if you need emergency services, please call our Orthopedic Navigator Help Line at [phone]. Your  is Geno Ruelas NP. You can call 24/7 - there will be a medical provider available to help you.    Straight cath every 8 hours as needed for residual urine.  TTWB LLE

## 2019-06-04 NOTE — PATIENT PROFILE ADULT - BRADEN SENSORY
"ER Provider Note     Scribed for Aj Kolb, * by Eleuterio Marroquin. 6/6/2017, 10:15 PM.    Primary Care Provider: JEAN Junior  Means of Arrival: Walk In   History obtained from: Parent  History limited by: None      CHIEF COMPLAINT   Chief Complaint   Patient presents with   • Runny Nose   • Vomiting   • Loss of Appetite     HPI   Sanchez Lombardi is a 4 y.o. who was brought into the ED for vomiting. Four days ago, patient had his immunizations updated at his PCP office. The patient has had constant nasal congestion and dry cough for the last four days. Today, patient had an onset of vomiting at 1 PM. He experienced two episodes of normal appearing emesis this afternoon. He has not had any further episodes of emesis. Patient has had a decreased appetite throughout the day today associated with his vomiting. Patient was born full term without complications. Patient is otherwise healthy, immunization records are up to date.     REVIEW OF SYSTEMS   General: No fever or chills.  Eyes: No eye discharge  Ear nose throat: No  trouble swallowing or ear pulling.   Pulmonary: No difficulty breathing   GI: No diarrhea.  : No hematuria.  Dermatologic: No rashes or abrasions  Neurologic: No weakness      E.     PAST MEDICAL HISTORY  Past Medical History   Diagnosis Date   • Breath-holding spell 2013   • Seizure (CMS-HCC)      had one 01/04/14; has \"breath-holding spells\"   • Other specified disorder of intestines      Diarrhea   • Otitis media of both ears      Vaccinations are up to date.    SURGICAL HISTORY  Past Surgical History   Procedure Laterality Date   • Myringotomy  7/18/2014     Performed by Rajat Nunes M.D. at SURGERY SAME DAY [facility]     SOCIAL HISTORY  accompanied by parents    CURRENT MEDICATIONS  Home Medications     Reviewed by Eve Galicia R.N. (Registered Nurse) on 06/06/17 at 2038  Med List Status: <None>    Medication Last Dose Status          Patient Mihai Taking any " "Medications                      ALLERGIES   No Known Allergies    PHYSICAL EXAM   Vital Signs: BP 84/64 mmHg  Pulse 117  Temp(Src) 36.8 °C (98.2 °F)  Resp 28  Ht 1.016 m (3' 4\")  Wt 16.1 kg (35 lb 7.9 oz)  BMI 15.60 kg/m2  SpO2 96%      Constitutional: Well developed, Well nourished, No acute distress, Non-toxic appearance.   HENT: Normocephalic, Atraumatic, Bilateral external ears normal, TM's are clear bilaterally. Oropharynx moist, No oral exudates, Nose normal.   Eyes: PERRLA, EOMI, Conjunctiva normal, No discharge.   Musculoskeletal: Neck has Normal range of motion, No tenderness, Supple.  Lymphatic: No cervical lymphadenopathy noted.   Cardiovascular: Normal heart rate, Normal rhythm, No murmurs, No rubs, No gallops.   Thorax & Lungs: Normal breath sounds, No respiratory distress, No wheezing, No chest tenderness. No accessory muscle use no stridor  Skin: Warm, Dry, No erythema, No rash.   Abdomen: Bowel sounds normal, Soft, No tenderness, No masses.  Neurologic: Alert & oriented moves all extremities equally    COURSE & MEDICAL DECISION MAKING   Nursing notes, VS, PMSFSHx reviewed in chart     10:15 PM - Patient was evaluated; The patient will be medicated with Tylenol 240 mg PO for his symptoms. Patient will be monitored for tolerance of fluids after treatment with Zofran PO.     This patient is here with vomiting initially and his cough no signs of croup otherwise looks well nontoxic he's crying and is standing up in his mother's lap she has great crocodile tears this shows no signs of dehydration. Zofran seemed to work is no vomiting tolerating by mouth because of a wet cough. Repeat lung exam again no wheezes no rales no hypoxia no signs of pneumonia except for the cough which most likely is viral his given high set. Careful attention was made the patient elected significant Tylenol. Patient is point improved and we'll discharge him home. Recommend follow-up with primary care physician for further " workup if needed I was return here if coughing reading vomiting worsen.    DISPOSITION:  Patient will be discharged home in stable condition.    FOLLOW UP:  JEAN Junior  [address]  [address]  [place] [address]  [phone]          [facility], Emergency Dept  [address]  [place] [address]  [phone]    If symptoms worsen      OUTPATIENT MEDICATIONS:  New Prescriptions    ONDANSETRON (ZOFRAN ODT) 4 MG TABLET DISPERSIBLE    Take 0.5 Tabs by mouth every 8 hours as needed for Nausea/Vomiting.       Guardian was given return precautions and verbalizes understanding. They will return to the ED with new or worsening symptoms.     FINAL IMPRESSION   1. Vomiting without nausea, intractability of vomiting not specified, unspecified vomiting type    2. Cough         Eleuterio HERNANDEZ (López), am scribing for, and in the presence of, Aj Kolb, *.    Electronically signed by: Eleuterio Marroquin (López), 6/6/2017    Aj HERNANDEZ, * personally performed the services described in this documentation, as scribed by Eleuterio Marroquin in my presence, and it is both accurate and complete.    The note accurately reflects work and decisions made by me.  Aj Kolb  6/7/2017  12:24 AM      (4) no impairment

## 2019-06-04 NOTE — PHYSICAL THERAPY INITIAL EVALUATION ADULT - ADL SKILLS, REHAB EVAL
Diagnosis: L hip/knee stiffness  Authorized # of Visits:  [id number]   Next MD visit: none scheduled  Fall Risk: standard         Precautions: n/a           Medication Changes since last visit?: No   Discharge Summry    Subjective: Patient reports her R foot is fe needs device/independent up/down stairs. - able to go up 1 step 6\"  3. Patient to be able to balance on L leg for 10 seconds or greater.-Not met  4. Increase in hip strength to 3/5 or greater to improve stability with daily activities.  - Not met            Plan: Discharge to inde

## 2019-06-04 NOTE — PROGRESS NOTE ADULT - SUBJECTIVE AND OBJECTIVE BOX
73yFemale s/p L ANGEL LUIS POD#0 by Dr. Snider. Pt seen and examined in NAD. Pain controlled. Pt denies any new complaints. Pt denies CP/SOB/N/V/D/numbness/tingling/bowel or bladder dysfunction.     PE:   LLE: dressing c/d/i. +ROM ankle/toes. Calf: soft, compressible and nontender. DP/PT 2+ NVI.                           11.5   18.62 )-----------( 255      ( 04 Jun 2019 16:29 )             36.4                 A/P: 73yFemale s/p L ANGEL LUIS POD#0  Dressing changed - mepelix applied.   Pain controlled  Total hip precautions  PT: TTWB   DVT ppx: SCDs/ASA 325mg BID    Wound care, Isometric exercises, incentive spirometry   Discharge: planning   All the above discussed and understood by pt

## 2019-06-04 NOTE — PHYSICAL THERAPY INITIAL EVALUATION ADULT - STRENGTHENING, PT EVAL
Pt will improve strength in Left LE to 4+/5, all other extremities by 5/5 in 2 to 3 weeks to perform ADL, Gait independently

## 2019-06-04 NOTE — PHYSICAL THERAPY INITIAL EVALUATION ADULT - ASR EQUIP NEEDS DISCH PT EVAL
3:1 commode/Pt already received rolling walker, however returned 3:1 commode, since it doesn't fit her toilet.

## 2019-06-04 NOTE — PHYSICAL THERAPY INITIAL EVALUATION ADULT - ADDITIONAL COMMENTS
verified postoperatively, Pt lives alone (nobody can provide assist upon D/C home) in a private home, 1 entry step (no rail), 2 steps c B/L rails inside home.  Pt has a tub/shower combo with a retractable shower head, standard toilet seat height, & no grab bar. Pt states she is currently independent with all functional mobility including household ambulation c straight cane, and community ambulation with rollator. Pt states she is independent with ADL's as well. Pt is right hand dominant, wears eye glasses, doesn't drive, & is retired. Pt reports daily 0/10 pain & states it is worse with any activity. Pt reports she has the most difficulty time "getting up & moving around" after prolonged sitting. Pt endorses taking narcotics for pain management. Goal of therapy: manage pain & improve functional mobility.

## 2019-06-04 NOTE — PHYSICAL THERAPY INITIAL EVALUATION ADULT - CRITERIA FOR SKILLED THERAPEUTIC INTERVENTIONS
predicted duration of therapy intervention/anticipated equipment needs at discharge/therapy frequency/anticipated discharge recommendation/impairments found/Subacute rehab/functional limitations in following categories/risk reduction/prevention/rehab potential

## 2019-06-04 NOTE — PHYSICAL THERAPY INITIAL EVALUATION ADULT - RANGE OF MOTION EXAMINATION, REHAB EVAL
no ROM deficits were identified/AAROm of left hip flexiion to 70 degrees grossly graded, all other joints are WFL

## 2019-06-04 NOTE — PATIENT PROFILE ADULT - VISION (WITH CORRECTIVE LENSES IF THE PATIENT USUALLY WEARS THEM):
Normal vision: sees adequately in most situations; can see medication labels, newsprint/wear glasses for reading

## 2019-06-04 NOTE — PHYSICAL THERAPY INITIAL EVALUATION ADULT - TRANSFER TRAINING, PT EVAL
Pt will be able to do sit to stand, stand pivot using rolling walker, maintaining WB precaution in left LE, independently in 3 to 4 weeks

## 2019-06-04 NOTE — DISCHARGE NOTE PROVIDER - CARE PROVIDER_API CALL
Jama Snider)  Orthopaedic Surgery  [address] [place] [address]  Phone: [phone]  Fax: [phone]  Follow Up Time:

## 2019-06-04 NOTE — DISCHARGE NOTE PROVIDER - NSDCACTIVITY_GEN_ALL_CORE
No heavy lifting/straining/Showering allowed/Walking - Indoors allowed/Walking - Outdoors allowed/Do not drive or operate machinery/Do not make important decisions/Stairs allowed

## 2019-06-04 NOTE — PHYSICAL THERAPY INITIAL EVALUATION ADULT - BALANCE TRAINING, PT EVAL
Pt will be  good in static, dynamic standing balance in 3 to 4 weeks maintaining WB precaution in left LE to perform ADLs, Gait independently

## 2019-06-04 NOTE — DISCHARGE NOTE PROVIDER - NSDCFUADDINST_GEN_ALL_CORE_FT
Keep Prineo Dressing Clean, Dry and Intact. May shower with Prineo Dressing. Please do not scrub, soak, peel or pick at the prineo dressing. No creams, lotions, or oils over dressing. May shower and let water run over incision, no baths. Pat dry once out of shower. Dressing to be removed in office at follow up visit in 2 weeks. Keep Prineo Dressing Clean, Dry and Intact. May shower with Prineo Dressing. Please do not scrub, soak, peel or pick at the prineo dressing. No creams, lotions, or oils over dressing. May shower and let water run over incision, no baths. Pat dry once out of shower. Dressing to be removed in office at follow up visit in 2 weeks.  Hip replacement precautions: Abduction pillow. Elevate the leg (while keeping hip precautions) as often as possible to help control swelling.

## 2019-06-04 NOTE — PHYSICAL THERAPY INITIAL EVALUATION ADULT - GAIT TRAINING, PT EVAL
Pt will be able to ambulate for 150 feet using Rolling walker maintaining WB precaution in left LE, independently in 3 to 4 weeks

## 2019-06-04 NOTE — DISCHARGE NOTE PROVIDER - HOSPITAL COURSE
73yFemale with history of OA presenting for L ANGEL LUIS by Dr. Snider on 6/4/19. Risk and benefits of surgery were explained to the patient. The patient understood and agreed to proceed with surgery. Patient underwent the procedure with no intraoperative complications. Pt was brought in stable condition to the PACU. Once stable in PACU, pt was brought to the floor. During hospital stay pt was followed by Medicine, physical therapy, Home Care during this admission. Pt had an uneventful hospital course. Pt is stable for discharge to home 73yFemale with history of OA presenting for L ANGEL LUIS by Dr. Snider on 6/4/19. Risk and benefits of surgery were explained to the patient. The patient understood and agreed to proceed with surgery. Patient underwent the procedure with no intraoperative complications. Pt was brought in stable condition to the PACU. Once stable in PACU, pt was brought to the floor. During hospital stay pt was followed by Medicine, physical therapy, Home Care during this admission. Pt had an uneventful hospital course. Pt is stable for discharge to home on POD#1.

## 2019-06-05 ENCOUNTER — TRANSCRIPTION ENCOUNTER (OUTPATIENT)
Age: 74
End: 2019-06-05

## 2019-06-05 VITALS
HEART RATE: 104 BPM | SYSTOLIC BLOOD PRESSURE: 122 MMHG | OXYGEN SATURATION: 95 % | DIASTOLIC BLOOD PRESSURE: 73 MMHG | TEMPERATURE: 98 F | RESPIRATION RATE: 18 BRPM

## 2019-06-05 LAB
ANION GAP SERPL CALC-SCNC: 8 MMOL/L — SIGNIFICANT CHANGE UP (ref 5–17)
BUN SERPL-MCNC: 33 MG/DL — HIGH (ref 7–23)
CALCIUM SERPL-MCNC: 8.3 MG/DL — LOW (ref 8.5–10.1)
CHLORIDE SERPL-SCNC: 107 MMOL/L — SIGNIFICANT CHANGE UP (ref 96–108)
CO2 SERPL-SCNC: 25 MMOL/L — SIGNIFICANT CHANGE UP (ref 22–31)
CREAT SERPL-MCNC: 0.75 MG/DL — SIGNIFICANT CHANGE UP (ref 0.5–1.3)
GLUCOSE SERPL-MCNC: 131 MG/DL — HIGH (ref 70–99)
HCT VFR BLD CALC: 27.7 % — LOW (ref 34.5–45)
HCT VFR BLD CALC: 30.8 % — LOW (ref 34.5–45)
HGB BLD-MCNC: 8.8 G/DL — LOW (ref 11.5–15.5)
HGB BLD-MCNC: 9.9 G/DL — LOW (ref 11.5–15.5)
MCHC RBC-ENTMCNC: 26 PG — LOW (ref 27–34)
MCHC RBC-ENTMCNC: 26.1 PG — LOW (ref 27–34)
MCHC RBC-ENTMCNC: 31.8 GM/DL — LOW (ref 32–36)
MCHC RBC-ENTMCNC: 32.1 GM/DL — SIGNIFICANT CHANGE UP (ref 32–36)
MCV RBC AUTO: 81.3 FL — SIGNIFICANT CHANGE UP (ref 80–100)
MCV RBC AUTO: 82 FL — SIGNIFICANT CHANGE UP (ref 80–100)
NRBC # BLD: 0 /100 WBCS — SIGNIFICANT CHANGE UP (ref 0–0)
NRBC # BLD: 0 /100 WBCS — SIGNIFICANT CHANGE UP (ref 0–0)
PLATELET # BLD AUTO: 232 K/UL — SIGNIFICANT CHANGE UP (ref 150–400)
PLATELET # BLD AUTO: 260 K/UL — SIGNIFICANT CHANGE UP (ref 150–400)
POTASSIUM SERPL-MCNC: 4.3 MMOL/L — SIGNIFICANT CHANGE UP (ref 3.5–5.3)
POTASSIUM SERPL-SCNC: 4.3 MMOL/L — SIGNIFICANT CHANGE UP (ref 3.5–5.3)
RBC # BLD: 3.38 M/UL — LOW (ref 3.8–5.2)
RBC # BLD: 3.79 M/UL — LOW (ref 3.8–5.2)
RBC # FLD: 14.6 % — HIGH (ref 10.3–14.5)
RBC # FLD: 14.6 % — HIGH (ref 10.3–14.5)
SODIUM SERPL-SCNC: 140 MMOL/L — SIGNIFICANT CHANGE UP (ref 135–145)
WBC # BLD: 13.46 K/UL — HIGH (ref 3.8–10.5)
WBC # BLD: 15.66 K/UL — HIGH (ref 3.8–10.5)
WBC # FLD AUTO: 13.46 K/UL — HIGH (ref 3.8–10.5)
WBC # FLD AUTO: 15.66 K/UL — HIGH (ref 3.8–10.5)

## 2019-06-05 RX ORDER — CELECOXIB 200 MG/1
1 CAPSULE ORAL
Qty: 0 | Refills: 0 | DISCHARGE
Start: 2019-06-05

## 2019-06-05 RX ORDER — OXYCODONE HYDROCHLORIDE 5 MG/1
1 TABLET ORAL
Qty: 0 | Refills: 0 | DISCHARGE
Start: 2019-06-05

## 2019-06-05 RX ORDER — ACETAMINOPHEN 500 MG
3 TABLET ORAL
Qty: 0 | Refills: 0 | DISCHARGE
Start: 2019-06-05

## 2019-06-05 RX ORDER — ASPIRIN/CALCIUM CARB/MAGNESIUM 324 MG
1 TABLET ORAL
Qty: 0 | Refills: 0 | DISCHARGE
Start: 2019-06-05

## 2019-06-05 RX ORDER — PANTOPRAZOLE SODIUM 20 MG/1
1 TABLET, DELAYED RELEASE ORAL
Qty: 0 | Refills: 0 | DISCHARGE
Start: 2019-06-05

## 2019-06-05 RX ORDER — IBUPROFEN 200 MG
1 TABLET ORAL
Qty: 0 | Refills: 0 | DISCHARGE

## 2019-06-05 RX ORDER — MAGNESIUM HYDROXIDE 400 MG/1
30 TABLET, CHEWABLE ORAL
Qty: 0 | Refills: 0 | DISCHARGE
Start: 2019-06-05

## 2019-06-05 RX ORDER — DOCUSATE SODIUM 100 MG
1 CAPSULE ORAL
Qty: 0 | Refills: 0 | DISCHARGE
Start: 2019-06-05

## 2019-06-05 RX ADMIN — POLYETHYLENE GLYCOL 3350 17 GRAM(S): 17 POWDER, FOR SOLUTION ORAL at 12:08

## 2019-06-05 RX ADMIN — OXYCODONE HYDROCHLORIDE 10 MILLIGRAM(S): 5 TABLET ORAL at 03:40

## 2019-06-05 RX ADMIN — OXYCODONE HYDROCHLORIDE 10 MILLIGRAM(S): 5 TABLET ORAL at 04:40

## 2019-06-05 RX ADMIN — OXYCODONE HYDROCHLORIDE 10 MILLIGRAM(S): 5 TABLET ORAL at 14:32

## 2019-06-05 RX ADMIN — Medication 100 MILLIGRAM(S): at 05:36

## 2019-06-05 RX ADMIN — Medication 325 MILLIGRAM(S): at 18:16

## 2019-06-05 RX ADMIN — Medication 100 MILLIGRAM(S): at 05:50

## 2019-06-05 RX ADMIN — Medication 325 MILLIGRAM(S): at 05:50

## 2019-06-05 RX ADMIN — OXYCODONE HYDROCHLORIDE 10 MILLIGRAM(S): 5 TABLET ORAL at 10:21

## 2019-06-05 RX ADMIN — Medication 1 MILLIGRAM(S): at 12:08

## 2019-06-05 RX ADMIN — OXYCODONE HYDROCHLORIDE 10 MILLIGRAM(S): 5 TABLET ORAL at 11:20

## 2019-06-05 RX ADMIN — Medication 1 TABLET(S): at 12:07

## 2019-06-05 RX ADMIN — OXYCODONE HYDROCHLORIDE 10 MILLIGRAM(S): 5 TABLET ORAL at 20:40

## 2019-06-05 RX ADMIN — Medication 102 MILLIGRAM(S): at 05:50

## 2019-06-05 RX ADMIN — OXYCODONE HYDROCHLORIDE 10 MILLIGRAM(S): 5 TABLET ORAL at 15:30

## 2019-06-05 RX ADMIN — Medication 325 MILLIGRAM(S): at 07:50

## 2019-06-05 RX ADMIN — Medication 100 MILLIGRAM(S): at 13:25

## 2019-06-05 RX ADMIN — OXYCODONE HYDROCHLORIDE 10 MILLIGRAM(S): 5 TABLET ORAL at 19:40

## 2019-06-05 RX ADMIN — Medication 325 MILLIGRAM(S): at 12:07

## 2019-06-05 RX ADMIN — PANTOPRAZOLE SODIUM 40 MILLIGRAM(S): 20 TABLET, DELAYED RELEASE ORAL at 07:50

## 2019-06-05 NOTE — OCCUPATIONAL THERAPY INITIAL EVALUATION ADULT - RANGE OF MOTION EXAMINATION, LOWER EXTREMITY
AAROM in left hip is 0-45 degrees with pain ./Right LE Active ROM was WFL   (within functional limits)/Right LE Passive ROM was WFL  (within functional limits)

## 2019-06-05 NOTE — OCCUPATIONAL THERAPY INITIAL EVALUATION ADULT - LIVES WITH, PROFILE
in a private house with 2 entry steps equipped with bilateral hand rails . The bathroom has a tub/shower combination and standard toilet./alone in a private house with 2 entry steps equipped with bilateral hand rails. The bathroom has a tub/shower combination and standard toilet../alone

## 2019-06-05 NOTE — CONSULT NOTE ADULT - ASSESSMENT
urinary retention: # 14 F 2 way 5 cc balloon Ac catheter inserted, 450 cc of urine drained post void.  continue Ac till patient is ambulatory

## 2019-06-05 NOTE — OCCUPATIONAL THERAPY INITIAL EVALUATION ADULT - IMPAIRED TRANSFERS: BED/CHAIR, REHAB EVAL
impaired balance/cognition/decreased ROM/pain/decreased flexibility/decreased strength/impaired coordination

## 2019-06-05 NOTE — OCCUPATIONAL THERAPY INITIAL EVALUATION ADULT - ADL RETRAINING, OT EVAL
Pt will perform all aspects of lower body tasks independently with set up within 4 weeks with adaptive device with full adherence to hip precautions.

## 2019-06-05 NOTE — OCCUPATIONAL THERAPY INITIAL EVALUATION ADULT - TRANSFER SAFETY CONCERNS NOTED: BED/CHAIR, REHAB EVAL
decreased balance during turns/decreased step length/stand pivot/decreased sequencing ability/inability to maintain weight-bearing restrictions w/o assist/decreased weight-shifting ability/squat pivot/stepping too close to front of assistive device

## 2019-06-05 NOTE — OCCUPATIONAL THERAPY INITIAL EVALUATION ADULT - IMPAIRMENTS CONTRIBUTING IMPAIRED BED MOBILITY, REHAB EVAL
impaired balance/narrow base of support/decreased ROM/cognition/decreased strength/impaired coordination/decreased flexibility/pain/impaired postural control

## 2019-06-05 NOTE — OCCUPATIONAL THERAPY INITIAL EVALUATION ADULT - SENSATION HOT/COLD, LUE, OT EVAL
Pre-Surgery Instructions:   Medication Instructions    aspirin (ASPIRIN LOW DOSE) 81 MG tablet Patient was instructed by Physician and understands   buPROPion (WELLBUTRIN XL) 150 mg 24 hr tablet Instructed patient per Anesthesia Guidelines   Cholecalciferol ([employer] VITAMIN D3) 1000 units capsule Patient was instructed by Physician and understands   Flaxseed, Linseed, (FLAX SEED OIL) 1300 MG CAPS Patient was instructed by Physician and understands  Pre-procedure instructions given without any questions or concerns at this time  Pt reports he has the [facility] wash and will review written instructions from Dr Mauricio Greer prior to use 
within normal limits

## 2019-06-05 NOTE — OCCUPATIONAL THERAPY INITIAL EVALUATION ADULT - PERTINENT HX OF CURRENT PROBLEM, REHAB EVAL
Pt wad admitted for elective surgery for complex left posterior THR due to OA, chronic pain and DJD.

## 2019-06-05 NOTE — OCCUPATIONAL THERAPY INITIAL EVALUATION ADULT - PERSONAL SAFETY AND JUDGMENT, REHAB EVAL
impaired/pt needs repeatd reminders to safely incorporate THP with ADL., functional mobility and to avoid internal rotation of left hip during turns.

## 2019-06-05 NOTE — OCCUPATIONAL THERAPY INITIAL EVALUATION ADULT - PRECAUTIONS/LIMITATIONS, REHAB EVAL
fall precautions/NO BP on LUE due to history of breast cancer. Pt has gait instability  and difficulty recalling and maintaining hip precautions fall precautions/NO BP on LUE due to history of breast cancer. Pt has gait instability and difficulty recalling and maintaining hip precautions

## 2019-06-05 NOTE — OCCUPATIONAL THERAPY INITIAL EVALUATION ADULT - PLANNED THERAPY INTERVENTIONS, OT EVAL
IADL retraining/parent/caregiver training.../balance training/motor coordination training/neuromuscular re-education/transfer training/ADL retraining/bed mobility training/fine motor coordination training/ROM/strengthening/stretching/energy conservation techniques

## 2019-06-05 NOTE — OCCUPATIONAL THERAPY INITIAL EVALUATION ADULT - ADDITIONAL COMMENTS
Prior to admission, Pt was functioning in her roles, self sufficient & ambulating independently with in the community with a single axis cane. Presently pt needs assistance with lower body self care tasks due to pain, weakness, stiffness and decreased ROM from left THR.  The scale below depicts a picture of the pt's current level of functioning. Barthel Index: Feeding Score___10___, Bathing Score___0___, Grooming Score__5___, Dressing Score__5___, Bowel Score___0__, Bladder Score____0__, Toilet Score___0__, Transfer Score___5___, Mobility Score___10__, Stairs Score____0_, Total Score_____. Pt is right hand dominant and wears glasses for reading. Dexterity and fine motor skills are diminished in both hands due to arthritic changes .

## 2019-06-05 NOTE — PROCEDURE NOTE - NSURITECHNIQUE_GU_A_CORE
The site was cleaned with soap/water and sterile solution (betadine)/The catheter was secured with a securement device (e.g. StatLock)/The urinary drainage system is closed at the end of the procedure/All applicable medical record documentation is completed/Proper hand hygiene was performed/A sterile drape was used to cover all adjacent areas/The catheter was appropriately lubricated/The collection bag is below the level of the patient and urinary bladder/Sterile gloves were worn for the duration of the procedure

## 2019-06-05 NOTE — PROGRESS NOTE ADULT - SUBJECTIVE AND OBJECTIVE BOX
ASPEN YANEZ is a 73y Female s/p LEFT COMPLEX POSTERIOR TOTAL HIP REPLACEMENT  by Dr. Snider on 6/4/19. patient tolerated surgery well.    ROS: no pulmonary, cardiovascular, gastrointestinal, urological or neurological symptoms.     T(C): 37 (06-05-19 @ 08:03), Max: 37 (06-05-19 @ 08:03)  HR: 92 (06-05-19 @ 08:03) (63 - 100)  BP: 119/54 (06-05-19 @ 08:03) (109/65 - 159/69)  RR: 18 (06-05-19 @ 08:03) (12 - 22)  SpO2: 95% (06-05-19 @ 08:03) (94% - 100%)  vital signs stable;  lungs, clear; heart, reg rhythm, no murmurs, rubs or gallops;   abd, soft, non tender, normal bowel sounds, no calf tenderness or edema                         8.8    13.46 )-----------( 232      ( 05 Jun 2019 06:31 )             27.7   06-05    140  |  107  |  33<H>  ----------------------------<  131<H>  4.3   |  25  |  0.75    Ca    8.3<L>      05 Jun 2019 06:31     Assessment and Plan: status post left total hip replacement; postop leucocytosis; postop anemia; history of uterine cancer;   history of breast cancer; obesity (BMI=31.6); pain control; deep vein thrombophlebitis prophylaxis; physical therapy;   bowel regimen; nutrition support; follow up labs; will follow.

## 2019-06-05 NOTE — DISCHARGE NOTE NURSING/CASE MANAGEMENT/SOCIAL WORK - NSDCDPATPORTLINK_GEN_ALL_CORE
You can access the [facility] Patient Portal, offered by [facility] [place], by registering with the following website: [email or web address]

## 2019-06-05 NOTE — PROGRESS NOTE ADULT - SUBJECTIVE AND OBJECTIVE BOX
Patient is seen and examined at bedside. Denies CP/SOB/Dizziness/N/V/D/HA. Pain is controlled.     Vital Signs Last 24 Hrs  T(C): 37 (05 Jun 2019 08:03), Max: 37 (05 Jun 2019 08:03)  T(F): 98.6 (05 Jun 2019 08:03), Max: 98.6 (05 Jun 2019 08:03)  HR: 92 (05 Jun 2019 08:03) (63 - 100)  BP: 119/54 (05 Jun 2019 08:03) (109/65 - 159/69)  BP(mean): --  RR: 18 (05 Jun 2019 08:03) (12 - 22)  SpO2: 95% (05 Jun 2019 08:03) (94% - 100%)    GEN: NAD  ABD: soft, NT/ND; no rebound or guarding;  Neurologic: AAOx3; CNS grossly intact; no focal deficits  Left hip: Prineo Dressing C/D/I. Mild edema. Compartments soft, compressible. abduction pillow in place  B/L LE's: Motor intact + EHL/FHL/TA/GS in the BL LE. Sensation is grossly intact B/L. Extremities warm B/L. Compartments soft, compressible B/L, no calf tenderness B/L. DP 2+ B/L.    Labs:                          8.8    13.46 )-----------( 232      ( 05 Jun 2019 06:31 )             27.7       06-05    140  |  107  |  33<H>  ----------------------------<  131<H>  4.3   |  25  |  0.75    Ca    8.3<L>      05 Jun 2019 06:31        A/P: Patient is a 73y y/o Female s/p Left total hip replacement with complex repair POD # 1  -wound care, isometric exercises, GI motility, new medications, hip precautions,  hospital course and discharge planning reviewed with pt  -Pain control/analgesia  -Acute post op anemia: F/U repeat CBC @ 13:00.   -Inc spirometry reviewed and counseled  -Venodynes/foot pumps  -PT/OT/WBAT  -Anticoagulation: ASA 325mg BID  -Pt seen in am with DR Snider  -DC plan: rehab

## 2019-06-05 NOTE — DISCHARGE NOTE NURSING/CASE MANAGEMENT/SOCIAL WORK - NSDCFUADDAPPT_GEN_ALL_CORE_FT
Follow up with Dr. Snider in 2 weeks. Please call [phone] for appointment.    If you have a question about your medicine, if you feel "off" or are having a "bad day," or if you need to see or speak with a doctor or if you need emergency services, please call our Orthopedic Navigator Help Line at [phone]. Your  is Geno Ruelas NP. You can call 24/7 - there will be a medical provider available to help you.    Straight cath every 8 hours as needed for residual urine.  TTWB LLE

## 2019-06-05 NOTE — CONSULT NOTE ADULT - SUBJECTIVE AND OBJECTIVE BOX
HPI:  73 yrs old female s/p Left hip replacement, developed urinary retention , inability to insert Ac catheter.    PAST MEDICAL & SURGICAL HISTORY:  Obesity  Uterine cancer: h/o  Osteoarthritis  Breast Cancer Left  Breast Cancer Right  Radiation Therapy (ICD9 V58.0): mammosite/balloon left breast for radiation treatment  Breast Cancer (ICD9 174.9)  S/P Lumpectomy of Breast Left  History of Right Breast Biopsy: 2003  Benign Cyst of Skin Left Shoulder Excision  History of Total Hysterectomy with Removal of Both Tubes and Ovaries: 2004  S/P Lumpectomy of Breast Left: 2003  S/P Lumpectomy of Breast Right: 2004 &amp; 2005  Status Post Breast Lumpectomy (ICD9 V45.89): left breast lumpectomy      Allergies    No Known Allergies    FAMILY HISTORY:  Family history of brain cancer (Father)  Family history of hypertension (Mother)      Home Medications:  acetaminophen 325 mg oral tablet: 3 tab(s) orally every 8 hours (05 Jun 2019 15:49)  aspirin 325 mg oral delayed release tablet: 1 tab(s) orally 2 times a day (05 Jun 2019 15:49)  celecoxib 200 mg oral capsule: 1 cap(s) orally once a day (05 Jun 2019 15:49)  docusate sodium 100 mg oral capsule: 1 cap(s) orally 3 times a day (05 Jun 2019 15:49)  magnesium hydroxide 8% oral suspension: 30 milliliter(s) orally once a day, As needed, Constipation (05 Jun 2019 15:49)  Multiple Vitamins oral tablet: 1 tab(s) orally once a day (05 Jun 2019 15:49)  oxyCODONE 10 mg oral tablet: 1 tab(s) orally every 4 hours, As needed, Pain 6-10 (05 Jun 2019 15:49)  oxyCODONE 5 mg oral tablet: 1 tab(s) orally every 4 hours, As needed, Pain 1-5 (05 Jun 2019 15:49)  pantoprazole 40 mg oral delayed release tablet: 1 tab(s) orally once a day (before a meal) (05 Jun 2019 15:49)      MEDICATIONS  (STANDING):  acetaminophen   Tablet .. 975 milliGRAM(s) Oral every 8 hours  aspirin enteric coated 325 milliGRAM(s) Oral two times a day  docusate sodium 100 milliGRAM(s) Oral three times a day  ferrous    sulfate 325 milliGRAM(s) Oral three times a day with meals  folic acid 1 milliGRAM(s) Oral daily  multivitamin 1 Tablet(s) Oral daily  pantoprazole    Tablet 40 milliGRAM(s) Oral before breakfast  polyethylene glycol 3350 17 Gram(s) Oral daily  sodium chloride 0.9%. 1000 milliLiter(s) (75 mL/Hr) IV Continuous <Continuous>    MEDICATIONS  (PRN):  aluminum hydroxide/magnesium hydroxide/simethicone Suspension 30 milliLiter(s) Oral four times a day PRN Indigestion  HYDROmorphone  Injectable 0.5 milliGRAM(s) IV Push every 4 hours PRN Breakthrough Pain  magnesium hydroxide Suspension 30 milliLiter(s) Oral daily PRN Constipation  ondansetron Injectable 4 milliGRAM(s) IV Push every 6 hours PRN Nausea and/or Vomiting  oxyCODONE    IR 5 milliGRAM(s) Oral every 4 hours PRN Pain 1-5  oxyCODONE    IR 10 milliGRAM(s) Oral every 4 hours PRN Pain 6-10  senna 2 Tablet(s) Oral at bedtime PRN Constipation      ROS:    General:  No wt loss, fevers, chills, night sweats  ENT:  No sore throat, pain, runny nose,   CV:  No pain, palpitatioins,  Resp:  No dyspnea, cough, tachypnea, wheezing  GI:  No pain, nausea, vomiting, diarrhea, constipatiion  :  nocturia, frequency present.  Neuro:  No weakness, tingling,   Endocrine:  No polyuria, polydypsia, cold/heat intolerance  Skin:  No rash,  edema      Physical Exam:    Vital Signs:  Vital Signs Last 24 Hrs  T(C): 36.8 (05 Jun 2019 13:00), Max: 37 (05 Jun 2019 08:03)  T(F): 98.2 (05 Jun 2019 13:00), Max: 98.6 (05 Jun 2019 08:03)  HR: 104 (05 Jun 2019 14:30) (82 - 104)  BP: 123/61 (05 Jun 2019 14:30) (109/65 - 156/76)  BP(mean): 80 (05 Jun 2019 14:30) (80 - 80)  RR: 18 (05 Jun 2019 08:03) (15 - 18)  SpO2: 98% (05 Jun 2019 13:00) (94% - 99%)  Daily     Daily   I&O's Summary    04 Jun 2019 07:01  -  05 Jun 2019 07:00  --------------------------------------------------------  IN: 1375 mL / OUT: 450 mL / NET: 925 mL    05 Jun 2019 07:01  -  05 Jun 2019 18:44  --------------------------------------------------------  IN: 0 mL / OUT: 350 mL / NET: -350 mL        General:  Appears stated age,  well-nourished, no distress  HEENT:  NC/AT, patent nares w/ pink mucosa, OP moist and pink,   conjunctivae clear  Chest:  Full & symmetric excursion, no increased effort.   Abdomen:  Soft, non-tender, non-distended, normoactive bowel sounds.  Pelvic exam: no mass. Ac catheter inserted.  Rectal Examination: Deferred.  Extremities:  no edema, pedal pulsation are present, no calf tenderness. s/p left hip replacement.  Skin:  No rash/erythema  Neuro/Psych:  Alert and conscious. Grossly intact and symmetrical.      LABS:                        9.9    15.66 )-----------( 260      ( 05 Jun 2019 14:09 )             30.8     06-05    140  |  107  |  33<H>  ----------------------------<  131<H>  4.3   |  25  |  0.75    Ca    8.3<L>      05 Jun 2019 06:31              RADIOLOGY & ADDITIONAL STUDIES:
ASPEN YANEZ is a 73y Female s/p LEFT COMPLEX POSTERIOR TOTAL HIP REPLACEMENT    by Dr. Snider on 6/4/19. complains of postop pain; patient tolerated surgery well.    PMH:  w/ h/o Obesity  Uterine cancer  Osteoarthritis  Abnormal Breast Tissue  Breast Cancer Left  Breast Cancer Right  Radiation Therapy (ICD9 V58.0)  History of Total Hysterectomy with Removal of Both Tubes and Ovaries (ICD9 V88.01)  S/P Right Breast Biopsy (ICD9 V45.89)  Breast Cancer (ICD9 174.9)    ROS:  no fevers, chills, headache, dizziness, lightheadedness, chest pain, palpitations, shortness of breath, cough, phlegm, wheezing, abdominal pain, nausea, vomiting, diarrhea, constipation or urinary symptoms     PSH:  H/O lumpectomy  S/P Lumpectomy of Breast Left  History of Right Breast Biopsy  Benign Cyst of Skin Left Shoulder Excision  History of Total Hysterectomy with Removal of Both Tubes and Ovaries  History of Hysterectomy  Breast Surgery  S/P Lumpectomy of Breast Left  S/P Lumpectomy of Breast Right  Status Post Breast Lumpectomy (ICD9 V45.89)    Family history of brain cancer (Father)  Family history of hypertension (Mother)    SH: does not smoke or drink at this time    No Known Allergies    MEDS:  acetaminophen   Tablet .. 975 milliGRAM(s) Oral every 8 hours  aluminum hydroxide/magnesium hydroxide/simethicone Suspension 30 milliLiter(s) Oral four times a day PRN  ceFAZolin   IVPB 2000 milliGRAM(s) IV Intermittent every 8 hours  docusate sodium 100 milliGRAM(s) Oral three times a day  ferrous    sulfate 325 milliGRAM(s) Oral three times a day with meals  folic acid 1 milliGRAM(s) Oral daily  HYDROmorphone  Injectable 0.5 milliGRAM(s) IV Push every 4 hours PRN  magnesium hydroxide Suspension 30 milliLiter(s) Oral daily PRN  multivitamin 1 Tablet(s) Oral daily  ondansetron Injectable 4 milliGRAM(s) IV Push every 6 hours PRN  oxyCODONE    IR 5 milliGRAM(s) Oral every 4 hours PRN  oxyCODONE    IR 10 milliGRAM(s) Oral every 4 hours PRN  pantoprazole    Tablet 40 milliGRAM(s) Oral before breakfast  polyethylene glycol 3350 17 Gram(s) Oral daily  senna 2 Tablet(s) Oral at bedtime PRN  sodium chloride 0.9%. 1000 milliLiter(s) IV Continuous <Continuous>    PHYS:  T(C): 36.2 (06-04-19 @ 18:12), Max: 36.8 (06-04-19 @ 17:30)  HR: 85 (06-04-19 @ 18:12) (63 - 100)  BP: 120/67 (06-04-19 @ 18:12) (120/58 - 159/69)  RR: 16 (06-04-19 @ 18:12) (12 - 22)  SpO2: 98% (06-04-19 @ 18:12) (98% - 100%)  HEENT unremarkable  neck no JVD or bruit  lungs, clear bilaterally   heart, regular rhythm, normal S1, S2, no murmurs, rubs or gallops   abdomen, soft, non tender, no organomegaly, normal bowel sounds   no cyanosis, clubbing, edema or calf tenderness; neuro, unremarkable                        11.5   18.62 )-----------( 255      ( 04 Jun 2019 16:29 )             36.4       Assessment and Plan: status post left total hip replacement; postop anemia; postop leucocytosis; obesity (BMI=31.6);   history of breast cancer; history of uterine cancer; pain control; deep vein thrombophlebitis prophylaxis;   physical therapy; bowel regimen; nutrition support; follow up labs; will follow.

## 2019-06-05 NOTE — OCCUPATIONAL THERAPY INITIAL EVALUATION ADULT - SOCIAL CONCERNS
Pt voiced concerns about her recovery due to lack of support./Complex psychosocial needs/coping issues

## 2019-06-05 NOTE — OCCUPATIONAL THERAPY INITIAL EVALUATION ADULT - GENERAL OBSERVATIONS, REHAB EVAL
Pt was seen for initial OT consult, encountered OOB to chair on in NAD; pt was AA&Ox4, anxious,  cooperative & followed commands.THP and TTWB on LLE were reviewed & maintained. Pt c/o left hip pain due to s/p posterior THR; this limits pt's activity tolerance ,balance, ADL management and functional mobility.

## 2019-06-07 LAB — SURGICAL PATHOLOGY STUDY: SIGNIFICANT CHANGE UP

## 2019-06-20 DIAGNOSIS — Z85.42 PERSONAL HISTORY OF MALIGNANT NEOPLASM OF OTHER PARTS OF UTERUS: ICD-10-CM

## 2019-06-20 DIAGNOSIS — Z92.3 PERSONAL HISTORY OF IRRADIATION: ICD-10-CM

## 2019-06-20 DIAGNOSIS — M16.12 UNILATERAL PRIMARY OSTEOARTHRITIS, LEFT HIP: ICD-10-CM

## 2019-06-20 DIAGNOSIS — D62 ACUTE POSTHEMORRHAGIC ANEMIA: ICD-10-CM

## 2019-06-20 DIAGNOSIS — Z90.79 ACQUIRED ABSENCE OF OTHER GENITAL ORGAN(S): ICD-10-CM

## 2019-06-20 DIAGNOSIS — Z85.3 PERSONAL HISTORY OF MALIGNANT NEOPLASM OF BREAST: ICD-10-CM

## 2019-06-20 DIAGNOSIS — E66.9 OBESITY, UNSPECIFIED: ICD-10-CM

## 2019-06-20 DIAGNOSIS — Z90.722 ACQUIRED ABSENCE OF OVARIES, BILATERAL: ICD-10-CM

## 2019-06-20 DIAGNOSIS — Z90.710 ACQUIRED ABSENCE OF BOTH CERVIX AND UTERUS: ICD-10-CM

## 2019-06-20 DIAGNOSIS — R33.9 RETENTION OF URINE, UNSPECIFIED: ICD-10-CM

## 2019-09-09 ENCOUNTER — OUTPATIENT (OUTPATIENT)
Dept: OUTPATIENT SERVICES | Facility: HOSPITAL | Age: 74
LOS: 1 days | End: 2019-09-09

## 2019-09-09 ENCOUNTER — APPOINTMENT (OUTPATIENT)
Dept: SURGERY | Facility: CLINIC | Age: 74
End: 2019-09-09
Payer: MEDICARE

## 2019-09-09 ENCOUNTER — OUTPATIENT (OUTPATIENT)
Dept: OUTPATIENT SERVICES | Facility: HOSPITAL | Age: 74
LOS: 1 days | End: 2019-09-09
Payer: MEDICARE

## 2019-09-09 ENCOUNTER — APPOINTMENT (OUTPATIENT)
Dept: ULTRASOUND IMAGING | Facility: IMAGING CENTER | Age: 74
End: 2019-09-09
Payer: MEDICARE

## 2019-09-09 VITALS
HEIGHT: 63 IN | WEIGHT: 164.91 LBS | RESPIRATION RATE: 14 BRPM | SYSTOLIC BLOOD PRESSURE: 152 MMHG | HEART RATE: 79 BPM | TEMPERATURE: 98 F | OXYGEN SATURATION: 98 % | DIASTOLIC BLOOD PRESSURE: 70 MMHG

## 2019-09-09 DIAGNOSIS — R03.0 ELEVATED BLOOD-PRESSURE READING, WITHOUT DIAGNOSIS OF HYPERTENSION: ICD-10-CM

## 2019-09-09 DIAGNOSIS — Z96.642 PRESENCE OF LEFT ARTIFICIAL HIP JOINT: Chronic | ICD-10-CM

## 2019-09-09 DIAGNOSIS — D05.11 INTRADUCTAL CARCINOMA IN SITU OF RIGHT BREAST: ICD-10-CM

## 2019-09-09 DIAGNOSIS — Z00.8 ENCOUNTER FOR OTHER GENERAL EXAMINATION: ICD-10-CM

## 2019-09-09 DIAGNOSIS — C50.919 MALIGNANT NEOPLASM OF UNSPECIFIED SITE OF UNSPECIFIED FEMALE BREAST: ICD-10-CM

## 2019-09-09 LAB
ALBUMIN SERPL ELPH-MCNC: 4.4 G/DL — SIGNIFICANT CHANGE UP (ref 3.3–5)
ALP SERPL-CCNC: 112 U/L — SIGNIFICANT CHANGE UP (ref 40–120)
ALT FLD-CCNC: 14 U/L — SIGNIFICANT CHANGE UP (ref 4–33)
ANION GAP SERPL CALC-SCNC: 11 MMO/L — SIGNIFICANT CHANGE UP (ref 7–14)
AST SERPL-CCNC: 11 U/L — SIGNIFICANT CHANGE UP (ref 4–32)
BILIRUB SERPL-MCNC: < 0.2 MG/DL — LOW (ref 0.2–1.2)
BUN SERPL-MCNC: 24 MG/DL — HIGH (ref 7–23)
CALCIUM SERPL-MCNC: 10.2 MG/DL — SIGNIFICANT CHANGE UP (ref 8.4–10.5)
CHLORIDE SERPL-SCNC: 105 MMOL/L — SIGNIFICANT CHANGE UP (ref 98–107)
CO2 SERPL-SCNC: 27 MMOL/L — SIGNIFICANT CHANGE UP (ref 22–31)
CREAT SERPL-MCNC: 0.72 MG/DL — SIGNIFICANT CHANGE UP (ref 0.5–1.3)
GLUCOSE SERPL-MCNC: 107 MG/DL — HIGH (ref 70–99)
HCT VFR BLD CALC: 40.4 % — SIGNIFICANT CHANGE UP (ref 34.5–45)
HGB BLD-MCNC: 11.9 G/DL — SIGNIFICANT CHANGE UP (ref 11.5–15.5)
MCHC RBC-ENTMCNC: 23.6 PG — LOW (ref 27–34)
MCHC RBC-ENTMCNC: 29.5 % — LOW (ref 32–36)
MCV RBC AUTO: 80 FL — SIGNIFICANT CHANGE UP (ref 80–100)
NRBC # FLD: 0 K/UL — SIGNIFICANT CHANGE UP (ref 0–0)
PLATELET # BLD AUTO: 320 K/UL — SIGNIFICANT CHANGE UP (ref 150–400)
PMV BLD: 10.6 FL — SIGNIFICANT CHANGE UP (ref 7–13)
POTASSIUM SERPL-MCNC: 4.4 MMOL/L — SIGNIFICANT CHANGE UP (ref 3.5–5.3)
POTASSIUM SERPL-SCNC: 4.4 MMOL/L — SIGNIFICANT CHANGE UP (ref 3.5–5.3)
PROT SERPL-MCNC: 7.1 G/DL — SIGNIFICANT CHANGE UP (ref 6–8.3)
RBC # BLD: 5.05 M/UL — SIGNIFICANT CHANGE UP (ref 3.8–5.2)
RBC # FLD: 16.1 % — HIGH (ref 10.3–14.5)
SODIUM SERPL-SCNC: 143 MMOL/L — SIGNIFICANT CHANGE UP (ref 135–145)
WBC # BLD: 11.05 K/UL — HIGH (ref 3.8–10.5)
WBC # FLD AUTO: 11.05 K/UL — HIGH (ref 3.8–10.5)

## 2019-09-09 PROCEDURE — 19285 PERQ DEV BREAST 1ST US IMAG: CPT | Mod: LT

## 2019-09-09 PROCEDURE — 19285 PERQ DEV BREAST 1ST US IMAG: CPT

## 2019-09-09 PROCEDURE — C1739: CPT

## 2019-09-09 PROCEDURE — 99215K: CUSTOM

## 2019-09-09 NOTE — H&P PST ADULT - NSICDXPASTSURGICALHX_GEN_ALL_CORE_FT
PAST SURGICAL HISTORY:  Benign Cyst of Skin Left Shoulder Excision     History of Right Breast Biopsy 2003    History of total hip replacement, left     History of Total Hysterectomy with Removal of Both Tubes and Ovaries 2004    S/P Lumpectomy of Breast Left 2003    S/P Lumpectomy of Breast Left     S/P Lumpectomy of Breast Right 2004 & 2005    Status Post Breast Lumpectomy (ICD9 V45.89) left breast lumpectomy

## 2019-09-09 NOTE — H&P PST ADULT - HISTORY OF PRESENT ILLNESS
73y.o .female with hx of breast cancer 1991 left treated with radiation x 6 weeks, right breast cancer in 2003, s/p radiation x 3 weeks, reports abnormal mammogram in May 2019, followed by biopsy, preop diagnosis intraductal carcinoma in situ, denies breast tenderness, nipple discharge, weight loss or fatigue, presents to PST for evaluation for Left Partial Mastectomy with Seed Localization on 09/17/19

## 2019-09-09 NOTE — H&P PST ADULT - NSICDXPROBLEM_GEN_ALL_CORE_FT
PROBLEM DIAGNOSES  Problem: Breast cancer  Assessment and Plan: pt scheduled for Left Partial Mastectomy with Seed Localization on 09/17/19  Preop instructions provided. Pt verbalized understanding.   Pepcid for GI prophylaxis with written and verbal instruction provided    written and verbal instructions with teach back on chlorhexidine shampoo provided,  pt verbalized understanding with returned demonstration   s/p med eval in june prior to hip replacement surgery    Problem: Elevated BP without diagnosis of hypertension  Assessment and Plan: EKG in chart  stress test from 11/2018 requested PROBLEM DIAGNOSES  Problem: Breast cancer  Assessment and Plan: pt scheduled for Left Partial Mastectomy with Seed Localization on 09/17/19  Preop instructions provided. Pt verbalized understanding.   Pepcid for GI prophylaxis with written and verbal instruction provided    written and verbal instructions with teach back on chlorhexidine shampoo provided,  pt verbalized understanding with returned demonstration   s/p med eval in june prior to hip replacement surgery, last visit note requested  bilateral breast skin folds rash, spoke with [facility] office, [facility] examined pt today, and aware    Problem: Elevated BP without diagnosis of hypertension  Assessment and Plan: EKG in chart  stress test from 11/2018 requested

## 2019-09-09 NOTE — H&P PST ADULT - NEGATIVE GENERAL GENITOURINARY SYMPTOMS
no flank pain R/no bladder infections/no flank pain L/no incontinence/no dysuria/no renal colic/no hematuria

## 2019-09-09 NOTE — H&P PST ADULT - NS PRO ABUSE SCREEN SUSPICION NEGLECT YN
[facility]  Psychiatric Progress Note      Impression:   This is a 29 year old yo male with a history of paranoid schizophrenia.  Miles remains disorganized in speech and thought today. He tells me that nothing really makes sense to him and continues to feel confused much of the time. Miles does agree to take medications and feels they do help him a bit. However, the time for stabilization could continue to take longer with each subsequent relapse. Will continue to evaluate for efficacy and side effects.        DIagnoses:   Paranoid schizophrenia with capgras delusions    Attestation:  Patient has been seen and evaluated by me,  Yvonne Pedersen NP          Interim History:   The patient's care was discussed with the treatment team and chart notes were reviewed.          Medications:     Current Facility-Administered Medications Ordered in Epic   Medication Dose Route Frequency Last Rate Last Dose     clotrimazole (LOTRIMIN) 1 % cream   Topical BID         cloZAPine (CLOZARIL) tablet 50 mg  50 mg Oral At Bedtime         cholecalciferol (vitamin D) tablet 2,000 Units  2,000 Units Oral Daily   2,000 Units at 08/22/17 0815     famotidine (PEPCID) tablet 20 mg  20 mg Oral BID   20 mg at 08/22/17 0815     benztropine (COGENTIN) tablet 0.5 mg  0.5 mg Oral BID PRN   0.5 mg at 08/21/17 1801     hydrOXYzine (ATARAX) tablet 25-50 mg  25-50 mg Oral Q4H PRN         acetaminophen (TYLENOL) tablet 650 mg  650 mg Oral Q4H PRN         alum & mag hydroxide-simethicone (MYLANTA ES/MAALOX  ES) suspension 30 mL  30 mL Oral Q4H PRN         magnesium hydroxide (MILK OF MAGNESIA) suspension 30 mL  30 mL Oral At Bedtime PRN         traZODone (DESYREL) tablet 50 mg  50 mg Oral At Bedtime PRN         OLANZapine (zyPREXA) tablet 10 mg  10 mg Oral Q2H PRN        Or     OLANZapine (zyPREXA) injection 10 mg  10 mg Intramuscular Q2H PRN         nicotine polacrilex (NICORETTE) gum 2-4 mg  2-4 mg Buccal Q1H PRN         Current  Outpatient Prescriptions Ordered in Epic   Medication     cloZAPine (CLOZARIL) 100 MG tablet     Clozapine (CLOZARIL) 200 MG tablet          10 point ROS attempted - offered no complaints.       Allergies:     Allergies   Allergen Reactions     Pcn [Bicillin C-R,] Rash     Provider wants to try amoxicillin(benadryl ordered as well) 9/10/16     Bupropion Other (See Comments)     delusions      Depakote [Valproic Acid] Other (See Comments)     Heart racing     Divalproex Sodium-Fd&C Red #40      Increase heart rate            Psychiatric Examination:   /81  Pulse 85  Temp 98  F (36.7  C) (Tympanic)  Resp 16  Ht 1.829 m (6')  Wt 103.4 kg (228 lb)  SpO2 96%  BMI 30.92 kg/m2  Weight is 228 lbs 0 oz  Body mass index is 30.92 kg/(m^2).    Appearance: awake, alert, continues to pace  Attitude: attempts to be cooperative  Eye Contact: brief  Mood: improving with some intermittent irritability  Affect:  blunted  Speech:  Clear, rarely logical, content delusional  Psychomotor Behavior:  no evidence of tardive dyskinesia, dystonia, or tics  Thought Process:  disorganized and evidence of thought blocking present frequently expresses feeling confused  Associations:  loosening of associations present  Thought Content:  no evidence of suicidal ideation or homicidal ideation and patient appears to be responding to internal stimuli  Insight:  limited  Judgment:  limited  Oriented to:  time, person, and place  Attention Span and Concentration:  limited  Recent and Remote Memory:  fair  Fund of Knowledge: low-normal  Muscle Strength and Tone: normal  Gait and Station: Normal           Labs:   No results found for this or any previous visit (from the past 24 hour(s)).           Plan:   Increase Clozaril to 125mg in the AM.     ELOS: Questionable - it seems to take Miles longer and longer to recover when his medications are stopped. Hoping to stabilize him to the point of being able to participate in groups and refer to an  IRTS program. He remains voluntary and has previously expressed a willingness to do this. Remains delusional, preoccupied, and disorganized. Unable to care for self and unsafe to discharge.    no

## 2019-09-09 NOTE — H&P PST ADULT - BREASTS COMMENTS
rash under breast skin folds, bilaterally; right breast @ 11 o'clock lump and discoloration, pt states " its from radiation", left breast DSD inplace, intact s/p marker placement today; breast assymetry rash under breast skin folds, bilaterally; right breast @ 11 o'clock lump and discoloration, pt states " its from radiation", left breast DSD inplace, intact s/p marker placement today, no mass palpated on the left, well healed surgical incision left breast; breast assymetry

## 2019-09-09 NOTE — H&P PST ADULT - ABILITY TO HEAR (WITH HEARING AID OR HEARING APPLIANCE IF NORMALLY USED):
Mildly to Moderately Impaired: difficulty hearing in some environments or speaker may need to increase volume or speak distinctly/dose not use hearing aid

## 2019-09-16 RX ORDER — SODIUM CHLORIDE 9 MG/ML
1000 INJECTION, SOLUTION INTRAVENOUS
Refills: 0 | Status: DISCONTINUED | OUTPATIENT
Start: 2019-09-17 | End: 2019-10-04

## 2019-09-17 ENCOUNTER — APPOINTMENT (OUTPATIENT)
Dept: SURGERY | Facility: HOSPITAL | Age: 74
End: 2019-09-17

## 2019-09-17 ENCOUNTER — OUTPATIENT (OUTPATIENT)
Dept: OUTPATIENT SERVICES | Facility: HOSPITAL | Age: 74
LOS: 1 days | Discharge: HOME | End: 2019-09-17
Payer: MEDICARE

## 2019-09-17 ENCOUNTER — RESULT REVIEW (OUTPATIENT)
Age: 74
End: 2019-09-17

## 2019-09-17 VITALS
HEIGHT: 63 IN | SYSTOLIC BLOOD PRESSURE: 166 MMHG | WEIGHT: 164.91 LBS | OXYGEN SATURATION: 98 % | DIASTOLIC BLOOD PRESSURE: 66 MMHG | HEART RATE: 74 BPM | RESPIRATION RATE: 16 BRPM | TEMPERATURE: 98 F

## 2019-09-17 VITALS — HEART RATE: 56 BPM | SYSTOLIC BLOOD PRESSURE: 124 MMHG | TEMPERATURE: 97 F | DIASTOLIC BLOOD PRESSURE: 66 MMHG

## 2019-09-17 DIAGNOSIS — D05.11 INTRADUCTAL CARCINOMA IN SITU OF RIGHT BREAST: ICD-10-CM

## 2019-09-17 DIAGNOSIS — Z96.642 PRESENCE OF LEFT ARTIFICIAL HIP JOINT: Chronic | ICD-10-CM

## 2019-09-17 PROCEDURE — 76098 X-RAY EXAM SURGICAL SPECIMEN: CPT | Mod: 26

## 2019-09-17 PROCEDURE — 19301K: CUSTOM | Mod: LT

## 2019-09-17 PROCEDURE — 88305 TISSUE EXAM BY PATHOLOGIST: CPT | Mod: 26

## 2019-09-17 RX ORDER — IBUPROFEN 200 MG
1 TABLET ORAL
Qty: 0 | Refills: 0 | DISCHARGE

## 2019-09-17 NOTE — ASU DISCHARGE PLAN (ADULT/PEDIATRIC) - COMMENTS
Please call to schedule a follow up appointment with Dr Ferguson in one week.  You may call [phone] to schedule your appointment.

## 2019-09-17 NOTE — ASU PATIENT PROFILE, ADULT - ABILITY TO HEAR (WITH HEARING AID OR HEARING APPLIANCE IF NORMALLY USED):
dose not use hearing aid/Mildly to Moderately Impaired: difficulty hearing in some environments or speaker may need to increase volume or speak distinctly

## 2019-09-17 NOTE — ASU PATIENT PROFILE, ADULT - PSH
Benign Cyst of Skin Left Shoulder Excision    History of Right Breast Biopsy  2003  History of total hip replacement, left    History of Total Hysterectomy with Removal of Both Tubes and Ovaries  2004  S/P Lumpectomy of Breast Left    S/P Lumpectomy of Breast Left  2003  S/P Lumpectomy of Breast Right  2004 & 2005  Status Post Breast Lumpectomy (ICD9 V45.89)  left breast lumpectomy

## 2019-09-17 NOTE — ASU PATIENT PROFILE, ADULT - PMH
Breast Cancer (ICD9 174.9)    Breast Cancer Left    Breast Cancer Right    Obesity    Osteoarthritis    Radiation Therapy (ICD9 V58.0)  mammosite/balloon left breast for radiation treatment  Uterine cancer  h/o

## 2019-09-17 NOTE — ASU DISCHARGE PLAN (ADULT/PEDIATRIC) - CARE PROVIDER_API CALL
Mary Ferguson)  [facility]  [address], [address]  [place], [place] [id number]  Phone: [phone]  Fax: [phone]  Follow Up Time:

## 2019-09-19 LAB — SURGICAL PATHOLOGY STUDY: SIGNIFICANT CHANGE UP

## 2019-09-23 ENCOUNTER — APPOINTMENT (OUTPATIENT)
Dept: SURGERY | Facility: CLINIC | Age: 74
End: 2019-09-23
Payer: MEDICARE

## 2019-09-23 PROCEDURE — 99024 POSTOP FOLLOW-UP VISIT: CPT

## 2019-09-23 RX ORDER — CEPHALEXIN 500 MG/1
500 CAPSULE ORAL 4 TIMES DAILY
Qty: 30 | Refills: 0 | Status: ACTIVE | COMMUNITY
Start: 2019-09-23 | End: 1900-01-01

## 2019-10-02 NOTE — OCCUPATIONAL THERAPY INITIAL EVALUATION ADULT - PROPRIOCEPTION, LUE, OT EVAL
GENERAL PRE-PROCEDURE:   Procedure:  Coronary angiogram  Date/Time:  10/2/2019 10:51 AM    Verbal consent obtained?: Yes    Written consent obtained?: Yes    Risks and benefits: Risks, benefits and alternatives were discussed    Consent given by:  Patient  Patient states understanding of procedure being performed: Yes    Patient's understanding of procedure matches consent: Yes    Procedure consent matches procedure scheduled: Yes    Expected level of sedation:  Moderate  Appropriately NPO:  Yes  ASA Class:  Class 3- Severe systemic disease, definite functional limitations  Mallampati  :  Grade 3- soft palate visible, posterior pharyngeal wall not visible  Lungs:  Lungs clear with good breath sounds bilaterally  Heart:  Normal heart sounds and rate  History & Physical reviewed:  History and physical reviewed and no updates needed  Statement of review:  I have reviewed the lab findings, diagnostic data, medications, and the plan for sedation    
GENERAL PRE-PROCEDURE:   Procedure:  Coronary angiogram  Date/Time:  10/2/2019 2:35 PM    Verbal consent obtained?: Yes    Written consent obtained?: Yes    Risks and benefits: Risks, benefits and alternatives were discussed    Consent given by:  Patient  Patient states understanding of procedure being performed: Yes    Patient's understanding of procedure matches consent: Yes    Procedure consent matches procedure scheduled: Yes    Expected level of sedation:  Moderate  Appropriately NPO:  Yes  ASA Class:  Class 3- Severe systemic disease, definite functional limitations  Mallampati  :  Grade 3- soft palate visible, posterior pharyngeal wall not visible  Lungs:  Lungs clear with good breath sounds bilaterally  Heart:  Normal heart sounds and rate  History & Physical reviewed:  History and physical reviewed and no updates needed  Statement of review:  I have reviewed the lab findings, diagnostic data, medications, and the plan for sedation    
within normal limits

## 2020-03-04 ENCOUNTER — APPOINTMENT (OUTPATIENT)
Dept: SURGERY | Facility: CLINIC | Age: 75
End: 2020-03-04
Payer: MEDICARE

## 2020-03-04 PROCEDURE — 99213K: CUSTOM

## 2020-05-29 ENCOUNTER — OUTPATIENT (OUTPATIENT)
Dept: OUTPATIENT SERVICES | Facility: HOSPITAL | Age: 75
LOS: 1 days | End: 2020-05-29
Payer: MEDICARE

## 2020-05-29 ENCOUNTER — RESULT REVIEW (OUTPATIENT)
Age: 75
End: 2020-05-29

## 2020-05-29 ENCOUNTER — APPOINTMENT (OUTPATIENT)
Dept: ULTRASOUND IMAGING | Facility: CLINIC | Age: 75
End: 2020-05-29
Payer: MEDICARE

## 2020-05-29 ENCOUNTER — APPOINTMENT (OUTPATIENT)
Dept: MAMMOGRAPHY | Facility: CLINIC | Age: 75
End: 2020-05-29
Payer: MEDICARE

## 2020-05-29 DIAGNOSIS — Z00.8 ENCOUNTER FOR OTHER GENERAL EXAMINATION: ICD-10-CM

## 2020-05-29 DIAGNOSIS — Z96.642 PRESENCE OF LEFT ARTIFICIAL HIP JOINT: Chronic | ICD-10-CM

## 2020-05-29 PROCEDURE — G0279: CPT

## 2020-05-29 PROCEDURE — 77066 DX MAMMO INCL CAD BI: CPT | Mod: 26

## 2020-05-29 PROCEDURE — G0279: CPT | Mod: 26

## 2020-05-29 PROCEDURE — 77066 DX MAMMO INCL CAD BI: CPT

## 2020-05-29 PROCEDURE — 76641 ULTRASOUND BREAST COMPLETE: CPT | Mod: 26,50

## 2020-05-29 PROCEDURE — 76641 ULTRASOUND BREAST COMPLETE: CPT

## 2020-06-03 ENCOUNTER — OUTPATIENT (OUTPATIENT)
Dept: OUTPATIENT SERVICES | Facility: HOSPITAL | Age: 75
LOS: 1 days | End: 2020-06-03
Payer: MEDICARE

## 2020-06-03 ENCOUNTER — RESULT REVIEW (OUTPATIENT)
Age: 75
End: 2020-06-03

## 2020-06-03 ENCOUNTER — APPOINTMENT (OUTPATIENT)
Dept: ULTRASOUND IMAGING | Facility: CLINIC | Age: 75
End: 2020-06-03
Payer: MEDICARE

## 2020-06-03 DIAGNOSIS — N63.20 UNSPECIFIED LUMP IN THE LEFT BREAST, UNSPECIFIED QUADRANT: ICD-10-CM

## 2020-06-03 DIAGNOSIS — Z96.642 PRESENCE OF LEFT ARTIFICIAL HIP JOINT: Chronic | ICD-10-CM

## 2020-06-03 PROCEDURE — 19083 BX BREAST 1ST LESION US IMAG: CPT | Mod: RT

## 2020-06-03 PROCEDURE — 77065 DX MAMMO INCL CAD UNI: CPT | Mod: 26,RT

## 2020-06-03 PROCEDURE — 19083 BX BREAST 1ST LESION US IMAG: CPT

## 2020-06-03 PROCEDURE — 88305 TISSUE EXAM BY PATHOLOGIST: CPT

## 2020-06-03 PROCEDURE — 88305 TISSUE EXAM BY PATHOLOGIST: CPT | Mod: 26

## 2020-06-03 PROCEDURE — A4648: CPT

## 2020-06-03 PROCEDURE — 77065 DX MAMMO INCL CAD UNI: CPT

## 2020-11-02 ENCOUNTER — APPOINTMENT (OUTPATIENT)
Dept: SURGERY | Facility: CLINIC | Age: 75
End: 2020-11-02
Payer: MEDICARE

## 2020-11-02 PROCEDURE — 99213K: CUSTOM

## 2021-06-01 ENCOUNTER — RESULT REVIEW (OUTPATIENT)
Age: 76
End: 2021-06-01

## 2021-06-01 ENCOUNTER — OUTPATIENT (OUTPATIENT)
Dept: OUTPATIENT SERVICES | Facility: HOSPITAL | Age: 76
LOS: 1 days | End: 2021-06-01
Payer: MEDICARE

## 2021-06-01 ENCOUNTER — APPOINTMENT (OUTPATIENT)
Dept: ULTRASOUND IMAGING | Facility: CLINIC | Age: 76
End: 2021-06-01
Payer: MEDICARE

## 2021-06-01 ENCOUNTER — APPOINTMENT (OUTPATIENT)
Dept: MAMMOGRAPHY | Facility: CLINIC | Age: 76
End: 2021-06-01
Payer: MEDICARE

## 2021-06-01 DIAGNOSIS — Z00.8 ENCOUNTER FOR OTHER GENERAL EXAMINATION: ICD-10-CM

## 2021-06-01 DIAGNOSIS — Z96.642 PRESENCE OF LEFT ARTIFICIAL HIP JOINT: Chronic | ICD-10-CM

## 2021-06-01 PROCEDURE — G0279: CPT | Mod: 26

## 2021-06-01 PROCEDURE — 77066 DX MAMMO INCL CAD BI: CPT | Mod: 26

## 2021-06-01 PROCEDURE — 76641 ULTRASOUND BREAST COMPLETE: CPT | Mod: 26,50

## 2021-06-01 PROCEDURE — 77066 DX MAMMO INCL CAD BI: CPT

## 2021-06-01 PROCEDURE — G0279: CPT

## 2021-06-01 PROCEDURE — 76641 ULTRASOUND BREAST COMPLETE: CPT

## 2021-06-09 ENCOUNTER — RESULT REVIEW (OUTPATIENT)
Age: 76
End: 2021-06-09

## 2021-06-09 ENCOUNTER — APPOINTMENT (OUTPATIENT)
Dept: ULTRASOUND IMAGING | Facility: CLINIC | Age: 76
End: 2021-06-09
Payer: MEDICARE

## 2021-06-09 ENCOUNTER — OUTPATIENT (OUTPATIENT)
Dept: OUTPATIENT SERVICES | Facility: HOSPITAL | Age: 76
LOS: 1 days | End: 2021-06-09
Payer: MEDICARE

## 2021-06-09 DIAGNOSIS — R92.8 OTHER ABNORMAL AND INCONCLUSIVE FINDINGS ON DIAGNOSTIC IMAGING OF BREAST: ICD-10-CM

## 2021-06-09 DIAGNOSIS — Z96.642 PRESENCE OF LEFT ARTIFICIAL HIP JOINT: Chronic | ICD-10-CM

## 2021-06-09 PROCEDURE — 88360 TUMOR IMMUNOHISTOCHEM/MANUAL: CPT | Mod: 26

## 2021-06-09 PROCEDURE — 88377 M/PHMTRC ALYS ISHQUANT/SEMIQ: CPT | Mod: 26

## 2021-06-09 PROCEDURE — 88341 IMHCHEM/IMCYTCHM EA ADD ANTB: CPT

## 2021-06-09 PROCEDURE — 88360 TUMOR IMMUNOHISTOCHEM/MANUAL: CPT

## 2021-06-09 PROCEDURE — 19083 BX BREAST 1ST LESION US IMAG: CPT | Mod: LT

## 2021-06-09 PROCEDURE — 88305 TISSUE EXAM BY PATHOLOGIST: CPT | Mod: 26

## 2021-06-09 PROCEDURE — 88305 TISSUE EXAM BY PATHOLOGIST: CPT

## 2021-06-09 PROCEDURE — 77065 DX MAMMO INCL CAD UNI: CPT

## 2021-06-09 PROCEDURE — 19083 BX BREAST 1ST LESION US IMAG: CPT

## 2021-06-09 PROCEDURE — 77065 DX MAMMO INCL CAD UNI: CPT | Mod: 26,LT

## 2021-06-09 PROCEDURE — 88342 IMHCHEM/IMCYTCHM 1ST ANTB: CPT | Mod: 26,59

## 2021-06-09 PROCEDURE — 88377 M/PHMTRC ALYS ISHQUANT/SEMIQ: CPT

## 2021-06-09 PROCEDURE — A4648: CPT

## 2021-06-09 PROCEDURE — 88342 IMHCHEM/IMCYTCHM 1ST ANTB: CPT | Mod: XU

## 2021-06-14 LAB — SURGICAL PATHOLOGY STUDY: SIGNIFICANT CHANGE UP

## 2021-06-21 ENCOUNTER — OUTPATIENT (OUTPATIENT)
Dept: OUTPATIENT SERVICES | Facility: HOSPITAL | Age: 76
LOS: 1 days | End: 2021-06-21
Payer: MEDICARE

## 2021-06-21 VITALS
RESPIRATION RATE: 20 BRPM | DIASTOLIC BLOOD PRESSURE: 70 MMHG | WEIGHT: 179.9 LBS | OXYGEN SATURATION: 98 % | SYSTOLIC BLOOD PRESSURE: 119 MMHG | HEIGHT: 63 IN | HEART RATE: 93 BPM | TEMPERATURE: 98 F

## 2021-06-21 DIAGNOSIS — D05.12 INTRADUCTAL CARCINOMA IN SITU OF LEFT BREAST: ICD-10-CM

## 2021-06-21 DIAGNOSIS — Z01.812 ENCOUNTER FOR PREPROCEDURAL LABORATORY EXAMINATION: ICD-10-CM

## 2021-06-21 DIAGNOSIS — H91.90 UNSPECIFIED HEARING LOSS, UNSPECIFIED EAR: ICD-10-CM

## 2021-06-21 DIAGNOSIS — Z96.642 PRESENCE OF LEFT ARTIFICIAL HIP JOINT: Chronic | ICD-10-CM

## 2021-06-21 LAB
ALBUMIN SERPL ELPH-MCNC: 4.7 G/DL — SIGNIFICANT CHANGE UP (ref 3.3–5)
ALP SERPL-CCNC: 99 U/L — SIGNIFICANT CHANGE UP (ref 40–120)
ALT FLD-CCNC: 19 U/L — SIGNIFICANT CHANGE UP (ref 4–33)
ANION GAP SERPL CALC-SCNC: 15 MMOL/L — HIGH (ref 7–14)
AST SERPL-CCNC: 17 U/L — SIGNIFICANT CHANGE UP (ref 4–32)
BILIRUB SERPL-MCNC: <0.2 MG/DL — SIGNIFICANT CHANGE UP (ref 0.2–1.2)
BUN SERPL-MCNC: 28 MG/DL — HIGH (ref 7–23)
CALCIUM SERPL-MCNC: 10.4 MG/DL — SIGNIFICANT CHANGE UP (ref 8.4–10.5)
CHLORIDE SERPL-SCNC: 103 MMOL/L — SIGNIFICANT CHANGE UP (ref 98–107)
CO2 SERPL-SCNC: 24 MMOL/L — SIGNIFICANT CHANGE UP (ref 22–31)
CREAT SERPL-MCNC: 0.86 MG/DL — SIGNIFICANT CHANGE UP (ref 0.5–1.3)
GLUCOSE SERPL-MCNC: 83 MG/DL — SIGNIFICANT CHANGE UP (ref 70–99)
HCT VFR BLD CALC: 42.8 % — SIGNIFICANT CHANGE UP (ref 34.5–45)
HGB BLD-MCNC: 13.4 G/DL — SIGNIFICANT CHANGE UP (ref 11.5–15.5)
MCHC RBC-ENTMCNC: 25.9 PG — LOW (ref 27–34)
MCHC RBC-ENTMCNC: 31.3 GM/DL — LOW (ref 32–36)
MCV RBC AUTO: 82.6 FL — SIGNIFICANT CHANGE UP (ref 80–100)
NRBC # BLD: 0 /100 WBCS — SIGNIFICANT CHANGE UP
NRBC # FLD: 0 K/UL — SIGNIFICANT CHANGE UP
PLATELET # BLD AUTO: 312 K/UL — SIGNIFICANT CHANGE UP (ref 150–400)
POTASSIUM SERPL-MCNC: 4.3 MMOL/L — SIGNIFICANT CHANGE UP (ref 3.5–5.3)
POTASSIUM SERPL-SCNC: 4.3 MMOL/L — SIGNIFICANT CHANGE UP (ref 3.5–5.3)
PROT SERPL-MCNC: 7.3 G/DL — SIGNIFICANT CHANGE UP (ref 6–8.3)
RBC # BLD: 5.18 M/UL — SIGNIFICANT CHANGE UP (ref 3.8–5.2)
RBC # FLD: 15.7 % — HIGH (ref 10.3–14.5)
SODIUM SERPL-SCNC: 142 MMOL/L — SIGNIFICANT CHANGE UP (ref 135–145)
WBC # BLD: 12.67 K/UL — HIGH (ref 3.8–10.5)
WBC # FLD AUTO: 12.67 K/UL — HIGH (ref 3.8–10.5)

## 2021-06-21 PROCEDURE — 93010 ELECTROCARDIOGRAM REPORT: CPT

## 2021-06-21 NOTE — H&P PST ADULT - NSICDXPROBLEM_GEN_ALL_CORE_FT
PROBLEM DIAGNOSES  Problem: Encounter for preprocedure screening laboratory testing for COVID-19  Assessment and Plan: vaccine completed 4/6/21, copy in chart     Problem: Intraductal carcinoma in situ of left breast  Assessment and Plan: Scheduled for left partial mastectomy with seed localization 6/29/21   Written & verbal preop instructions, gi prophylaxis & surgical soap given  Pt verbalized good understanding.  Teach back done on surgical soap instructions.         PROBLEM DIAGNOSES  Problem: Encounter for preprocedure screening laboratory testing for COVID-19  Assessment and Plan: vaccine completed 4/6/21, copy in chart     Problem: Intraductal carcinoma in situ of left breast  Assessment and Plan: Scheduled for left partial mastectomy with seed localization 6/29/21   Written & verbal preop instructions, gi prophylaxis & surgical soap given  Pt verbalized good understanding.  Teach back done on surgical soap instructions.      Problem: [place] (hard of hearing)  Assessment and Plan: Pt very hard of hearing,  pt hasd no hearing aides difficult to obtain medical hx, limited mobility ambulates with rollater.    spoke with Dr Ferguson pt is having minimal invasive surgery, no medical eval required.  As per Dr Ferguson pt can remain of Ibuprofen       PROBLEM DIAGNOSES  Problem: Encounter for preprocedure screening laboratory testing for COVID-19  Assessment and Plan: vaccine completed 4/6/21, copy in chart     Problem: Intraductal carcinoma in situ of left breast  Assessment and Plan: Scheduled for left partial mastectomy with seed localization 6/29/21   Written & verbal preop instructions, gi prophylaxis & surgical soap given  Pt verbalized good understanding.  Teach back done on surgical soap instructions  pending copy of comparison Ekg & most recent cardiology studies       Problem: [place] (hard of hearing)  Assessment and Plan: Pt very hard of hearing,  pt hasd no hearing aides difficult to obtain medical hx, limited mobility ambulates with rollater.    spoke with Dr Ferguson pt is having minimal invasive surgery, no medical eval required.  As per Dr Ferguson pt can remain of Ibuprofen

## 2021-06-21 NOTE — H&P PST ADULT - NSICDXPASTSURGICALHX_GEN_ALL_CORE_FT
PAST SURGICAL HISTORY:  Benign Cyst of Skin Left Shoulder Excision     History of Right Breast Biopsy 2003    History of total hip replacement, left 2019    History of Total Hysterectomy with Removal of Both Tubes and Ovaries 2004    S/P Lumpectomy of Breast Left 2003    S/P Lumpectomy of Breast Left     S/P Lumpectomy of Breast Right 2004 & 2005    Status Post Breast Lumpectomy (ICD9 V45.89) left breast lumpectomy

## 2021-06-21 NOTE — H&P PST ADULT - NSICDXPASTMEDICALHX_GEN_ALL_CORE_FT
PAST MEDICAL HISTORY:  Breast Cancer (ICD9 174.9)     Breast Cancer Left     Breast Cancer Right     Obesity     Osteoarthritis     Radiation Therapy (ICD9 V58.0) mammosite/balloon left breast for radiation treatment    Uterine cancer h/o     PAST MEDICAL HISTORY:  Breast Cancer (ICD9 174.9)     Breast Cancer Left NO B/P IV BLOOD DRAW LEFT ARM    Breast Cancer Right     Obesity     Osteoarthritis     Radiation Therapy (ICD9 V58.0) mammosite/balloon left breast for radiation treatment    Uterine cancer h/o

## 2021-06-21 NOTE — H&P PST ADULT - HISTORY OF PRESENT ILLNESS
76y/o female presents for preop eval for scheduled left partial mastectomy with seed localization.  P 76y/o female presents for preop eval for scheduled left partial mastectomy with seed localization.  Pt with h/o recurrent left Breast malignancy, h/o uterine cancer post tamoxifen in 2004, h/o hysterectomy.  Pt states recent mammogram & sonogram detected abnormal Left Breast finding.  Biopsy done.  Preop dx intraductal carcinoma in situ of left Breast.  Pt very [place] no hearing aides, difficult to obtain pt hx.

## 2021-06-21 NOTE — H&P PST ADULT - BLOOD AVOIDANCE/RESTRICTIONS, PROFILE
Reviewed inpatient cardiac rehabilitation education with the patient. Phase 2 Cardiac Rehab referral will be made to [facility], [phone].  A home exercise prescription, activity restrictions & precautions, and appropriate risk factor education have been completed. All education is documented in the Cardiac Rehabilitation Education Record. Total time spent educating the patient was 30 minutes.         none

## 2021-06-22 ENCOUNTER — APPOINTMENT (OUTPATIENT)
Dept: ULTRASOUND IMAGING | Facility: IMAGING CENTER | Age: 76
End: 2021-06-22
Payer: MEDICARE

## 2021-06-22 ENCOUNTER — RESULT REVIEW (OUTPATIENT)
Age: 76
End: 2021-06-22

## 2021-06-22 ENCOUNTER — OUTPATIENT (OUTPATIENT)
Dept: OUTPATIENT SERVICES | Facility: HOSPITAL | Age: 76
LOS: 1 days | End: 2021-06-22
Payer: MEDICARE

## 2021-06-22 DIAGNOSIS — Z00.8 ENCOUNTER FOR OTHER GENERAL EXAMINATION: ICD-10-CM

## 2021-06-22 DIAGNOSIS — Z96.642 PRESENCE OF LEFT ARTIFICIAL HIP JOINT: Chronic | ICD-10-CM

## 2021-06-22 PROCEDURE — 19285 PERQ DEV BREAST 1ST US IMAG: CPT | Mod: LT

## 2021-06-22 PROCEDURE — 19285 PERQ DEV BREAST 1ST US IMAG: CPT

## 2021-06-22 PROCEDURE — C1739: CPT

## 2021-06-28 NOTE — ASU PATIENT PROFILE, ADULT - PSH
Benign Cyst of Skin Left Shoulder Excision    History of Right Breast Biopsy  2003  History of total hip replacement, left  2019  History of Total Hysterectomy with Removal of Both Tubes and Ovaries  2004  S/P Lumpectomy of Breast Left    S/P Lumpectomy of Breast Left  2003  S/P Lumpectomy of Breast Right  2004 & 2005  Status Post Breast Lumpectomy (ICD9 V45.89)  left breast lumpectomy

## 2021-06-28 NOTE — ASU PATIENT PROFILE, ADULT - ABILITY TO HEAR (WITH HEARING AID OR HEARING APPLIANCE IF NORMALLY USED):
Adequate: hears normal conversation without difficulty very [place]/Mildly to Moderately Impaired: difficulty hearing in some environments or speaker may need to increase volume or speak distinctly

## 2021-06-28 NOTE — ASU PATIENT PROFILE, ADULT - PMH
Breast Cancer (ICD9 174.9)    Breast Cancer Left  NO B/P IV BLOOD DRAW LEFT ARM  Breast Cancer Right    Obesity    Osteoarthritis    Radiation Therapy (ICD9 V58.0)  mammosite/balloon left breast for radiation treatment  Uterine cancer  h/o

## 2021-06-29 ENCOUNTER — OUTPATIENT (OUTPATIENT)
Dept: OUTPATIENT SERVICES | Facility: HOSPITAL | Age: 76
LOS: 1 days | Discharge: HOME | End: 2021-06-29
Payer: MEDICARE

## 2021-06-29 ENCOUNTER — APPOINTMENT (OUTPATIENT)
Dept: SURGERY | Facility: HOSPITAL | Age: 76
End: 2021-06-29

## 2021-06-29 ENCOUNTER — RESULT REVIEW (OUTPATIENT)
Age: 76
End: 2021-06-29

## 2021-06-29 VITALS
SYSTOLIC BLOOD PRESSURE: 150 MMHG | RESPIRATION RATE: 16 BRPM | OXYGEN SATURATION: 97 % | HEIGHT: 63 IN | DIASTOLIC BLOOD PRESSURE: 76 MMHG | HEART RATE: 87 BPM | TEMPERATURE: 99 F | WEIGHT: 179.9 LBS

## 2021-06-29 VITALS
OXYGEN SATURATION: 97 % | SYSTOLIC BLOOD PRESSURE: 148 MMHG | RESPIRATION RATE: 16 BRPM | DIASTOLIC BLOOD PRESSURE: 47 MMHG | HEART RATE: 71 BPM

## 2021-06-29 DIAGNOSIS — D05.12 INTRADUCTAL CARCINOMA IN SITU OF LEFT BREAST: ICD-10-CM

## 2021-06-29 DIAGNOSIS — Z96.642 PRESENCE OF LEFT ARTIFICIAL HIP JOINT: Chronic | ICD-10-CM

## 2021-06-29 PROCEDURE — 88307 TISSUE EXAM BY PATHOLOGIST: CPT | Mod: 26

## 2021-06-29 PROCEDURE — 76098 X-RAY EXAM SURGICAL SPECIMEN: CPT | Mod: 26

## 2021-06-29 PROCEDURE — 88305 TISSUE EXAM BY PATHOLOGIST: CPT | Mod: 26

## 2021-06-29 PROCEDURE — 19301K: CUSTOM | Mod: LT

## 2021-06-29 RX ORDER — IBUPROFEN 200 MG
1 TABLET ORAL
Qty: 0 | Refills: 0 | DISCHARGE

## 2021-06-29 NOTE — ASU DISCHARGE PLAN (ADULT/PEDIATRIC) - POST OP PHONE #
[phone] ///  [phone] pt. granted permission to leave message /and or speak with whoever answers the phone.

## 2021-06-29 NOTE — ASU DISCHARGE PLAN (ADULT/PEDIATRIC) - CARE PROVIDER_API CALL
Mary Ferguson)  [facility]  [address], [address]  [place], [place] [id number]  Phone: [phone]  Fax: [phone]  Follow Up Time:

## 2021-06-29 NOTE — ASU DISCHARGE PLAN (ADULT/PEDIATRIC) - PATIENT EDUCATION MATERIALS PROVIED
Dr Ramirez paper reviewed with patient and given in green folder/Provider pre-printed instructions given

## 2021-07-01 LAB — SURGICAL PATHOLOGY STUDY: SIGNIFICANT CHANGE UP

## 2021-07-07 ENCOUNTER — APPOINTMENT (OUTPATIENT)
Dept: SURGERY | Facility: CLINIC | Age: 76
End: 2021-07-07
Payer: MEDICARE

## 2021-07-07 PROCEDURE — 99024 POSTOP FOLLOW-UP VISIT: CPT

## 2021-08-31 NOTE — OCCUPATIONAL THERAPY INITIAL EVALUATION ADULT - PHYSICAL ASSIST/NONPHYSICAL ASSIST: SIT/STAND, REHAB EVAL
1 person assist/verbal cues Cheiloplasty (Less Than 50%) Text: A decision was made to reconstruct the defect with a  cheiloplasty.  The defect was undermined extensively.  Additional obicularis oris muscle was excised with a 15 blade scalpel.  The defect was converted into a full thickness wedge, of less than 50% of the vertical height of the lip, to facilite a better cosmetic result.  Small vessels were then tied off with 5-0 monocyrl. The obicularis oris, superficial fascia, adipose and dermis were then reapproximated.  After the deeper layers were approximated the epidermis was reapproximated with particular care given to realign the vermilion border.

## 2021-10-18 NOTE — H&P PST ADULT - NS PRO ABUSE SCREEN AFRAID ANYONE YN
Overweight Overweight Overweight Overweight Overweight Overweight Overweight Overweight Overweight Overweight Overweight Overweight Overweight Overweight Overweight Overweight Overweight Overweight Overweight Overweight Overweight Overweight Overweight no

## 2022-03-21 ENCOUNTER — APPOINTMENT (OUTPATIENT)
Dept: SURGERY | Facility: CLINIC | Age: 77
End: 2022-03-21
Payer: MEDICARE

## 2022-03-21 PROCEDURE — 99213K: CUSTOM

## 2022-06-02 ENCOUNTER — APPOINTMENT (OUTPATIENT)
Dept: MAMMOGRAPHY | Facility: CLINIC | Age: 77
End: 2022-06-02
Payer: MEDICARE

## 2022-06-02 ENCOUNTER — APPOINTMENT (OUTPATIENT)
Dept: ULTRASOUND IMAGING | Facility: CLINIC | Age: 77
End: 2022-06-02
Payer: MEDICARE

## 2022-06-02 ENCOUNTER — OUTPATIENT (OUTPATIENT)
Dept: OUTPATIENT SERVICES | Facility: HOSPITAL | Age: 77
LOS: 1 days | End: 2022-06-02
Payer: MEDICARE

## 2022-06-02 DIAGNOSIS — Z00.00 ENCOUNTER FOR GENERAL ADULT MEDICAL EXAMINATION WITHOUT ABNORMAL FINDINGS: ICD-10-CM

## 2022-06-02 DIAGNOSIS — Z96.642 PRESENCE OF LEFT ARTIFICIAL HIP JOINT: Chronic | ICD-10-CM

## 2022-06-02 PROCEDURE — G0279: CPT

## 2022-06-02 PROCEDURE — G0279: CPT | Mod: 26

## 2022-06-02 PROCEDURE — 76641 ULTRASOUND BREAST COMPLETE: CPT | Mod: 26,50

## 2022-06-02 PROCEDURE — 77066 DX MAMMO INCL CAD BI: CPT

## 2022-06-02 PROCEDURE — 77066 DX MAMMO INCL CAD BI: CPT | Mod: 26

## 2022-06-02 PROCEDURE — 76641 ULTRASOUND BREAST COMPLETE: CPT

## 2022-08-19 ENCOUNTER — INPATIENT (INPATIENT)
Facility: HOSPITAL | Age: 77
LOS: 6 days | Discharge: NURSING, REHAB OR LONG-TERM CARE | End: 2022-08-26
Attending: STUDENT IN AN ORGANIZED HEALTH CARE EDUCATION/TRAINING PROGRAM | Admitting: STUDENT IN AN ORGANIZED HEALTH CARE EDUCATION/TRAINING PROGRAM

## 2022-08-19 VITALS
RESPIRATION RATE: 17 BRPM | WEIGHT: 169.98 LBS | DIASTOLIC BLOOD PRESSURE: 66 MMHG | TEMPERATURE: 97 F | HEART RATE: 82 BPM | SYSTOLIC BLOOD PRESSURE: 155 MMHG | OXYGEN SATURATION: 96 % | HEIGHT: 63 IN

## 2022-08-19 DIAGNOSIS — Z96.642 PRESENCE OF LEFT ARTIFICIAL HIP JOINT: Chronic | ICD-10-CM

## 2022-08-19 LAB
ALBUMIN SERPL ELPH-MCNC: 3.9 G/DL — SIGNIFICANT CHANGE UP (ref 3.3–5)
ALP SERPL-CCNC: 153 U/L — HIGH (ref 40–120)
ALT FLD-CCNC: 87 U/L — HIGH (ref 12–78)
ANION GAP SERPL CALC-SCNC: 7 MMOL/L — SIGNIFICANT CHANGE UP (ref 5–17)
AST SERPL-CCNC: 66 U/L — HIGH (ref 15–37)
BASOPHILS # BLD AUTO: 0.1 K/UL — SIGNIFICANT CHANGE UP (ref 0–0.2)
BASOPHILS NFR BLD AUTO: 0.9 % — SIGNIFICANT CHANGE UP (ref 0–2)
BILIRUB SERPL-MCNC: 0.5 MG/DL — SIGNIFICANT CHANGE UP (ref 0.2–1.2)
BUN SERPL-MCNC: 32 MG/DL — HIGH (ref 7–23)
CALCIUM SERPL-MCNC: 9.2 MG/DL — SIGNIFICANT CHANGE UP (ref 8.5–10.1)
CHLORIDE SERPL-SCNC: 111 MMOL/L — HIGH (ref 96–108)
CO2 SERPL-SCNC: 25 MMOL/L — SIGNIFICANT CHANGE UP (ref 22–31)
CREAT SERPL-MCNC: 0.8 MG/DL — SIGNIFICANT CHANGE UP (ref 0.5–1.3)
EGFR: 76 ML/MIN/1.73M2 — SIGNIFICANT CHANGE UP
EOSINOPHIL # BLD AUTO: 0.15 K/UL — SIGNIFICANT CHANGE UP (ref 0–0.5)
EOSINOPHIL NFR BLD AUTO: 1.4 % — SIGNIFICANT CHANGE UP (ref 0–6)
FLUAV AG NPH QL: SIGNIFICANT CHANGE UP
FLUBV AG NPH QL: SIGNIFICANT CHANGE UP
GLUCOSE SERPL-MCNC: 117 MG/DL — HIGH (ref 70–99)
HCT VFR BLD CALC: 38.5 % — SIGNIFICANT CHANGE UP (ref 34.5–45)
HGB BLD-MCNC: 12.5 G/DL — SIGNIFICANT CHANGE UP (ref 11.5–15.5)
IMM GRANULOCYTES NFR BLD AUTO: 1.2 % — SIGNIFICANT CHANGE UP (ref 0–1.5)
LYMPHOCYTES # BLD AUTO: 1.94 K/UL — SIGNIFICANT CHANGE UP (ref 1–3.3)
LYMPHOCYTES # BLD AUTO: 17.8 % — SIGNIFICANT CHANGE UP (ref 13–44)
MCHC RBC-ENTMCNC: 25.9 PG — LOW (ref 27–34)
MCHC RBC-ENTMCNC: 32.5 G/DL — SIGNIFICANT CHANGE UP (ref 32–36)
MCV RBC AUTO: 79.9 FL — LOW (ref 80–100)
MONOCYTES # BLD AUTO: 1.07 K/UL — HIGH (ref 0–0.9)
MONOCYTES NFR BLD AUTO: 9.8 % — SIGNIFICANT CHANGE UP (ref 2–14)
NEUTROPHILS # BLD AUTO: 7.51 K/UL — HIGH (ref 1.8–7.4)
NEUTROPHILS NFR BLD AUTO: 68.9 % — SIGNIFICANT CHANGE UP (ref 43–77)
NRBC # BLD: 0 /100 WBCS — SIGNIFICANT CHANGE UP (ref 0–0)
PLATELET # BLD AUTO: 274 K/UL — SIGNIFICANT CHANGE UP (ref 150–400)
POTASSIUM SERPL-MCNC: 3.8 MMOL/L — SIGNIFICANT CHANGE UP (ref 3.5–5.3)
POTASSIUM SERPL-SCNC: 3.8 MMOL/L — SIGNIFICANT CHANGE UP (ref 3.5–5.3)
PROT SERPL-MCNC: 7.2 GM/DL — SIGNIFICANT CHANGE UP (ref 6–8.3)
RBC # BLD: 4.82 M/UL — SIGNIFICANT CHANGE UP (ref 3.8–5.2)
RBC # FLD: 15.9 % — HIGH (ref 10.3–14.5)
SARS-COV-2 RNA SPEC QL NAA+PROBE: SIGNIFICANT CHANGE UP
SODIUM SERPL-SCNC: 143 MMOL/L — SIGNIFICANT CHANGE UP (ref 135–145)
WBC # BLD: 10.9 K/UL — HIGH (ref 3.8–10.5)
WBC # FLD AUTO: 10.9 K/UL — HIGH (ref 3.8–10.5)

## 2022-08-19 PROCEDURE — 93010 ELECTROCARDIOGRAM REPORT: CPT

## 2022-08-19 PROCEDURE — 72110 X-RAY EXAM L-2 SPINE 4/>VWS: CPT | Mod: 26

## 2022-08-19 PROCEDURE — 99285 EMERGENCY DEPT VISIT HI MDM: CPT | Mod: GC

## 2022-08-19 RX ORDER — HEPARIN SODIUM 5000 [USP'U]/ML
5000 INJECTION INTRAVENOUS; SUBCUTANEOUS EVERY 12 HOURS
Refills: 0 | Status: DISCONTINUED | OUTPATIENT
Start: 2022-08-19 | End: 2022-08-26

## 2022-08-19 RX ORDER — LIDOCAINE 4 G/100G
1 CREAM TOPICAL ONCE
Refills: 0 | Status: COMPLETED | OUTPATIENT
Start: 2022-08-19 | End: 2022-08-19

## 2022-08-19 RX ORDER — MORPHINE SULFATE 50 MG/1
4 CAPSULE, EXTENDED RELEASE ORAL ONCE
Refills: 0 | Status: DISCONTINUED | OUTPATIENT
Start: 2022-08-19 | End: 2022-08-19

## 2022-08-19 RX ORDER — OXYCODONE HYDROCHLORIDE 5 MG/1
5 TABLET ORAL EVERY 8 HOURS
Refills: 0 | Status: DISCONTINUED | OUTPATIENT
Start: 2022-08-19 | End: 2022-08-22

## 2022-08-19 RX ORDER — SENNA PLUS 8.6 MG/1
2 TABLET ORAL AT BEDTIME
Refills: 0 | Status: DISCONTINUED | OUTPATIENT
Start: 2022-08-19 | End: 2022-08-26

## 2022-08-19 RX ORDER — KETOROLAC TROMETHAMINE 30 MG/ML
15 SYRINGE (ML) INJECTION ONCE
Refills: 0 | Status: DISCONTINUED | OUTPATIENT
Start: 2022-08-19 | End: 2022-08-19

## 2022-08-19 RX ORDER — CYCLOBENZAPRINE HYDROCHLORIDE 10 MG/1
5 TABLET, FILM COATED ORAL THREE TIMES A DAY
Refills: 0 | Status: DISCONTINUED | OUTPATIENT
Start: 2022-08-19 | End: 2022-08-26

## 2022-08-19 RX ORDER — ACETAMINOPHEN 500 MG
2 TABLET ORAL
Qty: 0 | Refills: 0 | DISCHARGE

## 2022-08-19 RX ORDER — METHOCARBAMOL 500 MG/1
500 TABLET, FILM COATED ORAL ONCE
Refills: 0 | Status: COMPLETED | OUTPATIENT
Start: 2022-08-19 | End: 2022-08-19

## 2022-08-19 RX ORDER — ACETAMINOPHEN 500 MG
975 TABLET ORAL ONCE
Refills: 0 | Status: COMPLETED | OUTPATIENT
Start: 2022-08-19 | End: 2022-08-19

## 2022-08-19 RX ADMIN — Medication 15 MILLIGRAM(S): at 08:07

## 2022-08-19 RX ADMIN — LIDOCAINE 1 PATCH: 4 CREAM TOPICAL at 08:08

## 2022-08-19 RX ADMIN — METHOCARBAMOL 500 MILLIGRAM(S): 500 TABLET, FILM COATED ORAL at 08:08

## 2022-08-19 RX ADMIN — Medication 975 MILLIGRAM(S): at 08:08

## 2022-08-19 RX ADMIN — MORPHINE SULFATE 4 MILLIGRAM(S): 50 CAPSULE, EXTENDED RELEASE ORAL at 10:39

## 2022-08-19 RX ADMIN — OXYCODONE HYDROCHLORIDE 5 MILLIGRAM(S): 5 TABLET ORAL at 20:19

## 2022-08-19 RX ADMIN — HEPARIN SODIUM 5000 UNIT(S): 5000 INJECTION INTRAVENOUS; SUBCUTANEOUS at 17:29

## 2022-08-19 RX ADMIN — Medication 975 MILLIGRAM(S): at 10:00

## 2022-08-19 RX ADMIN — LIDOCAINE 1 PATCH: 4 CREAM TOPICAL at 17:25

## 2022-08-19 RX ADMIN — CYCLOBENZAPRINE HYDROCHLORIDE 5 MILLIGRAM(S): 10 TABLET, FILM COATED ORAL at 22:38

## 2022-08-19 RX ADMIN — OXYCODONE HYDROCHLORIDE 5 MILLIGRAM(S): 5 TABLET ORAL at 21:19

## 2022-08-19 RX ADMIN — Medication 15 MILLIGRAM(S): at 10:00

## 2022-08-19 RX ADMIN — MORPHINE SULFATE 4 MILLIGRAM(S): 50 CAPSULE, EXTENDED RELEASE ORAL at 13:00

## 2022-08-19 NOTE — PHYSICAL THERAPY INITIAL EVALUATION PEDIATRIC - PERTINENT HX OF CURRENT PROBLEM, REHAB EVAL
8/19 Patient comes in due to back and lower limb pains, making ambulation difficult. Went to urgent care, on rheumatologist advice (concerned for fracture) and had XRay and ruled out

## 2022-08-19 NOTE — PATIENT PROFILE ADULT - HEALTH LITERACY
1.   local heat to the back.  Tyelonol for discomfort.    2.  Physical therapy referral    3.  Tizanidine 4 mg 4 times daily as needed for muscle spasm in the back.  He was cautioned that this may cause issues with fatigue.    4.  He should not use NSAIDs since he is anticoagulated    5.  Call if symptoms do not improve with physical therapy.       no

## 2022-08-19 NOTE — ED ADULT NURSE NOTE - NSIMPLEMENTINTERV_GEN_ALL_ED
Implemented All Fall with Harm Risk Interventions:  [place] to call system. Call bell, personal items and telephone within reach. Instruct patient to call for assistance. Room bathroom lighting operational. Non-slip footwear when patient is off stretcher. Physically safe environment: no spills, clutter or unnecessary equipment. Stretcher in lowest position, wheels locked, appropriate side rails in place. Provide visual cue, wrist band, yellow gown, etc. Monitor gait and stability. Monitor for mental status changes and reorient to person, place, and time. Review medications for side effects contributing to fall risk. Reinforce activity limits and safety measures with patient and family. Provide visual clues: red socks.

## 2022-08-19 NOTE — PHYSICAL THERAPY INITIAL EVALUATION ADULT - NSPTDISCHREC_GEN_A_CORE
Pending stairs assessment, will benefit from [facility] or Home PT via [facility] [address]--for falls prevention, pain management, strengthening and endurance training./Home PT Pending stairs assessment, will benefit from [facility] or Home PT via [facility]--for falls prevention, pain management, strengthening and endurance training. **Patient stated she did not think PT will be able to help; [facility]/Home PT

## 2022-08-19 NOTE — ED ADULT NURSE NOTE - NS ED PATIENT SAFETY CONCERN
----- Message from Eve Phipps sent at 9/20/2021 10:25 AM EDT -----  Subject: Refill Request    QUESTIONS  Name of Medication? traMADol (ULTRAM) 50 MG tablet  Patient-reported dosage and instructions? 50 mg as needed   How many days do you have left? 1  Preferred Pharmacy? [id number]  Pharmacy phone number (if available)? [phone]  ---------------------------------------------------------------------------  --------------  Vika MEDINA  What is the best way for the office to contact you? OK to leave message on   voicemail  Preferred Call Back Phone Number?  [phone] No

## 2022-08-19 NOTE — ED ADULT NURSE NOTE - OBJECTIVE STATEMENT
Patient is alert and oriented x4. Came in for generalized body pain x 2 weeks. Denies any falls or trauma. Says she has not been able to walk. Has a history of osteoarthritis. Took Oxycodone at 6am but no relief.

## 2022-08-19 NOTE — ED PROVIDER NOTE - OBJECTIVE STATEMENT
77 yo F w/ spinal stenosis and osteoarthritis who presents with back pain. Is chronically present but over past 2 weeks has been getting worse. Tuesday saw her rheumatologist who was concerned for fracture and prescribed her oxycodone and sent her to  where xrays were negative for fx. Pt came to ED bc pain got worse this morning. Denies any recent trauma or falls. Thinks this might have all started when she was getting heavy groceries 2 weeks ago. Pain is lower back and radiates down both legs equally. Denies saddle anesthesia, urinary or fecal incontinence or retention, fevers, chills. Lives alone, ambulates with walker outside and with cane inside at baseline.

## 2022-08-19 NOTE — H&P ADULT - HISTORY OF PRESENT ILLNESS
77 yo F     w/ spinal stenosis and osteoarthritis, R breast cancer      who presents with  h/o chronic   lower  back pain,  but over past 2 weeks has been getting worse.    Tuesday saw her rheumatologist who was concerned for fracture and prescribed her oxycodone and sent her to  where xrays were negative for fx.    pt  came to   e r for  worsening pain / denies any recent trauma or falls.    pt thinks  this might have all started when she was getting heavy groceries 2 weeks ago    Denies saddle anesthesia, urinary or fecal incontinence or retention, fevers, chills   .Lives alone, ambulates with walker    77 yo F     w/ spinal stenosis and osteoarthritis,   h/o b/l  breast cancer/  most recently  of  left breast  prior  to that, , it was her  right breast/  originally  dx  with breast  cancer > 15  yrs ago      who presents with  h/o chronic   lower  back pain,  but over past 2 weeks has been getting worse.    Tuesday saw her rheumatologist who was concerned for fracture and prescribed her oxycodone and sent her to  where xrays were negative for fx.    pt  came to   e r for  worsening pain / denies any recent trauma or falls.    pt thinks  this might have all started when she was getting heavy groceries 2 weeks ago    Denies saddle anesthesia, urinary or fecal incontinence or retention, fevers, chills   .Lives alone, ambulates with walker

## 2022-08-19 NOTE — ED PROVIDER NOTE - ATTENDING CONTRIBUTION TO CARE
Dichter: Pt seen w/ PGY3 EM resident, 76F w/ PMH spinal stenosis, OA BIBEMS worsening atraumatic low back pain, unrelieved w/ Oxycontin / Motrin. Pt lives alone, ambulates w/ cane or walker. Pt able to ambulate but w/ severe pain. AF, VSS. Well appearing, in NAD. Exam as noted in PE. Agree w/ planned w/u and dispo: XR imaging, pain control, PT eval.

## 2022-08-19 NOTE — ED PROVIDER NOTE - NSICDXPASTMEDICALHX_GEN_ALL_CORE_FT
PAST MEDICAL HISTORY:  Breast Cancer (ICD9 174.9)     Breast Cancer Left NO B/P IV BLOOD DRAW LEFT ARM    Breast Cancer Right     Obesity     Osteoarthritis     Radiation Therapy (ICD9 V58.0) mammosite/balloon left breast for radiation treatment    Uterine cancer h/o

## 2022-08-19 NOTE — PATIENT PROFILE ADULT - FALL HARM RISK - HARM RISK INTERVENTIONS

## 2022-08-19 NOTE — ED PROVIDER NOTE - NS ED ROS FT
GENERAL: No fever, chills  EYES: no vision changes, no discharge.   ENT: no difficulty swallowing or speaking   CARDIAC: no chest pain/pressure, SOB, lower extremity swelling  PULMONARY: no cough, SOB  GI: no abdominal pain, n/v/d  : no dysuria  SKIN: no rashes  NEURO: no headache, lightheadedness, paresthesia  MSK: No joint pain, myalgia, weakness. +back pain

## 2022-08-19 NOTE — H&P ADULT - NSHPPHYSICALEXAM_GEN_ALL_CORE
PHYSICAL EXAMINATION:  Vital Signs Last 24 Hrs  T(C): 36.8 (19 Aug 2022 11:08), Max: 36.8 (19 Aug 2022 11:08)  T(F): 98.2 (19 Aug 2022 11:08), Max: 98.2 (19 Aug 2022 11:08)  HR: 78 (19 Aug 2022 11:08) (78 - 82)  BP: 125/63 (19 Aug 2022 11:08) (125/63 - 155/66)  BP(mean): --  RR: 16 (19 Aug 2022 11:08) (16 - 17)  SpO2: 94% (19 Aug 2022 11:08) (94% - 96%)    Parameters below as of 19 Aug 2022 07:32  Patient On (Oxygen Delivery Method): room air      CAPILLARY BLOOD GLUCOSE            GENERAL: NAD, well-groomed,  HEAD:  atraumatic, normocephalic  EYES: sclera anicteric  ENMT: mucous membranes moist  NECK: supple, No JVD  CHEST/LUNG: clear to auscultation bilaterally;    no      rales   ,   no rhonchi,   HEART: normal S1, S2  ABDOMEN: BS+, soft, ND, NT   EXTREMITIES:    no    edema    b/l LEs  NEURO: awake, ,     moves all extremities.  no  leg weakness  SKIN: no     rash PHYSICAL EXAMINATION:  Vital Signs Last 24 Hrs  T(C): 36.8 (19 Aug 2022 11:08), Max: 36.8 (19 Aug 2022 11:08)  T(F): 98.2 (19 Aug 2022 11:08), Max: 98.2 (19 Aug 2022 11:08)  HR: 78 (19 Aug 2022 11:08) (78 - 82)  BP: 125/63 (19 Aug 2022 11:08) (125/63 - 155/66)  BP(mean): --  RR: 16 (19 Aug 2022 11:08) (16 - 17)  SpO2: 94% (19 Aug 2022 11:08) (94% - 96%)    Parameters below as of 19 Aug 2022 07:32  Patient On (Oxygen Delivery Method): room air      CAPILLARY BLOOD GLUCOSE            GENERAL: NAD, well-groomed,  HEAD:  atraumatic, normocephalic  EYES: sclera anicteric  ENMT: mucous membranes moist  NECK: supple, No JVD  CHEST/LUNG: clear to auscultation bilaterally;    no      rales   ,   no rhonchi,   HEART: normal S1, S2  ABDOMEN: BS+, soft, ND, NT   EXTREMITIES:    no    edema    b/l LEs  NEURO: awake, ,     moves all extremities.  no  leg weakness  SKIN: no     rash/  b/l breasts, no masses  palpated

## 2022-08-19 NOTE — H&P ADULT - ASSESSMENT
75 yo F     w/ spinal stenosis and osteoarthritis, R breast cancer      who presents with  h/o chronic   lower  back pain,  but over past 2 weeks has been getting worse.    Tuesday saw her rheumatologist who was concerned for fracture and prescribed her oxycodone and sent her to  where xrays were negative for fx.    pt  came to   e r for  worsening pain / denies any recent trauma or falls.    pt thinks  this might have all started when she was getting heavy groceries 2 weeks ago    Denies saddle anesthesia, urinary or fecal incontinence or retention, fevers, chills   .Lives alone, ambulates with walker       *  admitted with  worsening of her  c/.c  lower  back damian  Lumbar radiculopathy   pt is on pain meds  at home  xray  L spine,  report pending   on oxy/ flexeril   *  h/o  Ca breast    *  elevated lfts   CT  A/P, ordered     on dvt ppx/  s/q  heparin  PT eval   77 yo F     w/ spinal stenosis and osteoarthritis, R breast cancer      who presents with  h/o chronic   lower  back pain,  but over past 2 weeks has been getting worse.    Tuesday saw her rheumatologist who was concerned for fracture and prescribed her oxycodone and sent her to  where xrays were negative for fx.    pt  came to   e r for  worsening pain / denies any recent trauma or falls.    pt thinks  this might have all started when she was getting heavy groceries 2 weeks ago    Denies saddle anesthesia, urinary or fecal incontinence or retention, fevers, chills   .Lives alone, ambulates with walker       *  admitted with  worsening of her  c/.c  lower  back damian  Lumbar radiculopathy   pt is on pain meds  at home  xray  L spine,  report pending   on oxy/ flexeril   *  h/o  Ca breast  originally dx  in 1990's/  since then has had  recurrence  in    b/l breasts     per  pt, this recurrence has not required  chemo/ hormonal rx /  d r davida    *  elevated lfts   CT  A/P, ordered    r/o  liver  pathology  Vs  mets     *  on dvt ppx/  s/q  heparin  PT eval

## 2022-08-19 NOTE — ED ADULT TRIAGE NOTE - HEIGHT IN CM
Pt calls stating that she continues to have abd pain that radiates to her left vaginal area.  She also notes cloudy urine.  Last UA was negative and UCx showed <10,000 mixed.  Pt was going to reach out to GYN to discuss pain.     160.02

## 2022-08-19 NOTE — PHYSICAL THERAPY INITIAL EVALUATION ADULT - SINGLE LEG BALANCE TEST, REHAB EVAL
One Leg Stand Test (OLST): Right: unable Left: unable.  Functional test to assess fall risk. Both gait and stair negotiation require components of OLST. Participants unable to perform the OLST for at least 5 seconds are at increased risk for injurious fall.

## 2022-08-19 NOTE — ED PROVIDER NOTE - PROGRESS NOTE DETAILS
Siobhan: PT evaluated pt, able to ambulate w/ walker, did not attempt stairs. D/w pt emergency contact (friend, over the phone): Does not feel pt is safe d/c, lives alone, unable to care for self. Will admit to medicine (d/w Dr Bradshaw) Pt updated, understands / agrees w/ this plan.

## 2022-08-19 NOTE — ED PROVIDER NOTE - CLINICAL SUMMARY MEDICAL DECISION MAKING FREE TEXT BOX
Kinga - 75 yo F w/ spinal stenosis and osteoarthritis who presents with back pain. Is chronically present but over past 2 weeks has been getting worse. No red flag signs or symptoms concerning for cauda equina. Low concern for fx. Likely radiculopathy/herniated disc vs msk. Will give Tylenol, Robaxin, Toradol, lidocaine patch

## 2022-08-19 NOTE — ED PROVIDER NOTE - PHYSICAL EXAMINATION
Geno Donato MD  GENERAL: Patient awake alert NAD.  HEENT: NC/AT, Moist mucous membranes, EOMI.  LUNGS: CTAB, no wheezes or crackles.   CARDIAC: RRR, no m/r/g.    ABDOMEN: Soft, NT, ND, No rebound, guarding. No CVA tenderness.   EXT: No edema. No calf tenderness.   MSK: +bilateral paraspinal tenderness at L4-S1  NEURO: A&Ox3. Moving all extremities. LE sensation intact, strength limited 2/2 pain. Pain in back with straight leg raise bilat, L>R  SKIN: Warm and dry. No rash.  PSYCH: Normal affect.

## 2022-08-20 LAB
ALBUMIN SERPL ELPH-MCNC: 3.3 G/DL — SIGNIFICANT CHANGE UP (ref 3.3–5)
ALP SERPL-CCNC: 131 U/L — HIGH (ref 40–120)
ALT FLD-CCNC: 60 U/L — SIGNIFICANT CHANGE UP (ref 12–78)
ANION GAP SERPL CALC-SCNC: 2 MMOL/L — LOW (ref 5–17)
AST SERPL-CCNC: 27 U/L — SIGNIFICANT CHANGE UP (ref 15–37)
BILIRUB DIRECT SERPL-MCNC: 0.1 MG/DL — SIGNIFICANT CHANGE UP (ref 0–0.3)
BILIRUB INDIRECT FLD-MCNC: 0.2 MG/DL — SIGNIFICANT CHANGE UP (ref 0.2–1)
BILIRUB SERPL-MCNC: 0.3 MG/DL — SIGNIFICANT CHANGE UP (ref 0.2–1.2)
BUN SERPL-MCNC: 34 MG/DL — HIGH (ref 7–23)
CALCIUM SERPL-MCNC: 8.7 MG/DL — SIGNIFICANT CHANGE UP (ref 8.5–10.1)
CHLORIDE SERPL-SCNC: 111 MMOL/L — HIGH (ref 96–108)
CO2 SERPL-SCNC: 28 MMOL/L — SIGNIFICANT CHANGE UP (ref 22–31)
CREAT SERPL-MCNC: 0.68 MG/DL — SIGNIFICANT CHANGE UP (ref 0.5–1.3)
EGFR: 90 ML/MIN/1.73M2 — SIGNIFICANT CHANGE UP
GLUCOSE SERPL-MCNC: 107 MG/DL — HIGH (ref 70–99)
HCT VFR BLD CALC: 37.2 % — SIGNIFICANT CHANGE UP (ref 34.5–45)
HGB BLD-MCNC: 11.7 G/DL — SIGNIFICANT CHANGE UP (ref 11.5–15.5)
MCHC RBC-ENTMCNC: 25.3 PG — LOW (ref 27–34)
MCHC RBC-ENTMCNC: 31.5 G/DL — LOW (ref 32–36)
MCV RBC AUTO: 80.3 FL — SIGNIFICANT CHANGE UP (ref 80–100)
NRBC # BLD: 0 /100 WBCS — SIGNIFICANT CHANGE UP (ref 0–0)
PLATELET # BLD AUTO: 288 K/UL — SIGNIFICANT CHANGE UP (ref 150–400)
POTASSIUM SERPL-MCNC: 3.8 MMOL/L — SIGNIFICANT CHANGE UP (ref 3.5–5.3)
POTASSIUM SERPL-SCNC: 3.8 MMOL/L — SIGNIFICANT CHANGE UP (ref 3.5–5.3)
PROT SERPL-MCNC: 6.5 GM/DL — SIGNIFICANT CHANGE UP (ref 6–8.3)
RBC # BLD: 4.63 M/UL — SIGNIFICANT CHANGE UP (ref 3.8–5.2)
RBC # FLD: 15.9 % — HIGH (ref 10.3–14.5)
SODIUM SERPL-SCNC: 141 MMOL/L — SIGNIFICANT CHANGE UP (ref 135–145)
WBC # BLD: 11.9 K/UL — HIGH (ref 3.8–10.5)
WBC # FLD AUTO: 11.9 K/UL — HIGH (ref 3.8–10.5)

## 2022-08-20 PROCEDURE — 99233 SBSQ HOSP IP/OBS HIGH 50: CPT

## 2022-08-20 PROCEDURE — 72131 CT LUMBAR SPINE W/O DYE: CPT | Mod: 26

## 2022-08-20 RX ORDER — OXYCODONE AND ACETAMINOPHEN 5; 325 MG/1; MG/1
1 TABLET ORAL ONCE
Refills: 0 | Status: DISCONTINUED | OUTPATIENT
Start: 2022-08-20 | End: 2022-08-20

## 2022-08-20 RX ORDER — POLYETHYLENE GLYCOL 3350 17 G/17G
17 POWDER, FOR SOLUTION ORAL DAILY
Refills: 0 | Status: DISCONTINUED | OUTPATIENT
Start: 2022-08-20 | End: 2022-08-26

## 2022-08-20 RX ORDER — TRAMADOL HYDROCHLORIDE 50 MG/1
25 TABLET ORAL ONCE
Refills: 0 | Status: DISCONTINUED | OUTPATIENT
Start: 2022-08-20 | End: 2022-08-20

## 2022-08-20 RX ORDER — OXYCODONE HYDROCHLORIDE 5 MG/1
10 TABLET ORAL EVERY 12 HOURS
Refills: 0 | Status: DISCONTINUED | OUTPATIENT
Start: 2022-08-20 | End: 2022-08-22

## 2022-08-20 RX ADMIN — OXYCODONE HYDROCHLORIDE 5 MILLIGRAM(S): 5 TABLET ORAL at 05:12

## 2022-08-20 RX ADMIN — OXYCODONE AND ACETAMINOPHEN 1 TABLET(S): 5; 325 TABLET ORAL at 10:49

## 2022-08-20 RX ADMIN — HEPARIN SODIUM 5000 UNIT(S): 5000 INJECTION INTRAVENOUS; SUBCUTANEOUS at 05:11

## 2022-08-20 RX ADMIN — POLYETHYLENE GLYCOL 3350 17 GRAM(S): 17 POWDER, FOR SOLUTION ORAL at 16:29

## 2022-08-20 RX ADMIN — OXYCODONE HYDROCHLORIDE 5 MILLIGRAM(S): 5 TABLET ORAL at 06:12

## 2022-08-20 RX ADMIN — OXYCODONE HYDROCHLORIDE 10 MILLIGRAM(S): 5 TABLET ORAL at 17:49

## 2022-08-20 RX ADMIN — OXYCODONE AND ACETAMINOPHEN 1 TABLET(S): 5; 325 TABLET ORAL at 11:49

## 2022-08-20 RX ADMIN — TRAMADOL HYDROCHLORIDE 25 MILLIGRAM(S): 50 TABLET ORAL at 02:11

## 2022-08-20 RX ADMIN — CYCLOBENZAPRINE HYDROCHLORIDE 5 MILLIGRAM(S): 10 TABLET, FILM COATED ORAL at 21:12

## 2022-08-20 RX ADMIN — OXYCODONE HYDROCHLORIDE 10 MILLIGRAM(S): 5 TABLET ORAL at 18:49

## 2022-08-20 RX ADMIN — OXYCODONE HYDROCHLORIDE 5 MILLIGRAM(S): 5 TABLET ORAL at 18:33

## 2022-08-20 RX ADMIN — OXYCODONE HYDROCHLORIDE 5 MILLIGRAM(S): 5 TABLET ORAL at 19:30

## 2022-08-20 RX ADMIN — SENNA PLUS 2 TABLET(S): 8.6 TABLET ORAL at 21:12

## 2022-08-20 RX ADMIN — CYCLOBENZAPRINE HYDROCHLORIDE 5 MILLIGRAM(S): 10 TABLET, FILM COATED ORAL at 08:04

## 2022-08-20 RX ADMIN — TRAMADOL HYDROCHLORIDE 25 MILLIGRAM(S): 50 TABLET ORAL at 01:11

## 2022-08-20 NOTE — CHART NOTE - NSCHARTNOTEFT_GEN_A_CORE
Was informed by RN that pt had a bottle of Oxycodone in her bag. Pt was explained the need not to keep her medications with her and not to take them to prevent overdosing. At first pt refused stated that she would not take her oxycodone but she has been in a lot of pain. Was explained that pain medication has been ordered for her. Pt agreed to give Oxycodone and Ibuprofen to nursing staff.   A+Ox4  Continue pain management as prescribed

## 2022-08-20 NOTE — PROGRESS NOTE ADULT - ASSESSMENT
75 yo F     w/ spinal stenosis and osteoarthritis, R breast cancer      who presents with  h/o chronic   lower  back pain,  but over past 2 weeks has been getting worse.    Tuesday saw her rheumatologist who was concerned for fracture and prescribed her oxycodone and sent her to  where xrays were negative for fx.    pt  came to   e r for  worsening pain / denies any recent trauma or falls.    pt thinks  this might have all started when she was getting heavy groceries 2 weeks ago    Denies saddle anesthesia, urinary or fecal incontinence or retention, fevers, chills   .Lives alone, ambulates with walker       *  admitted with  worsening of her  c/.c  lower  back damian  Lumbar radiculopathy   pt is on pain meds  at home  xray  L spine,  report pending   on oxy/ flexeril  Still w intense pain today, will get ct lumb spine  add er oxycodone   needs stair assessment      *  h/o  Ca breast  originally dx  in 1990's/  since then has had  recurrence  in    b/l breasts     per  pt, this recurrence has not required  chemo/ hormonal rx /  d r davida      *  elevated lfts   improved, can follow as outpatient        *  on dvt ppx/  s/q  heparin

## 2022-08-20 NOTE — PROGRESS NOTE ADULT - SUBJECTIVE AND OBJECTIVE BOX
Patient is a 76y old  Female who presents with a chief complaint of c/c  back apin (19 Aug 2022 13:46)    INTERVAL HPI/OVERNIGHT EVENTS: complains of lb pain, no alarm symptoms     MEDICATIONS  (STANDING):  heparin   Injectable 5000 Unit(s) SubCutaneous every 12 hours  oxyCODONE  ER Tablet 10 milliGRAM(s) Oral every 12 hours  senna 2 Tablet(s) Oral at bedtime    MEDICATIONS  (PRN):  cyclobenzaprine 5 milliGRAM(s) Oral three times a day PRN Muscle Spasm  oxyCODONE    IR 5 milliGRAM(s) Oral every 8 hours PRN Moderate Pain (4 - 6)    Allergies    No Known Allergies    Intolerances      REVIEW OF SYSTEMS:  All other systems reviewed and are negative    Vital Signs Last 24 Hrs  T(C): 36.4 (20 Aug 2022 05:28), Max: 36.7 (19 Aug 2022 16:50)  T(F): 97.5 (20 Aug 2022 05:28), Max: 98.1 (19 Aug 2022 16:50)  HR: 85 (20 Aug 2022 05:28) (79 - 85)  BP: 148/78 (20 Aug 2022 05:28) (146/71 - 152/77)  BP(mean): --  RR: 18 (20 Aug 2022 05:28) (17 - 18)  SpO2: 96% (20 Aug 2022 05:28) (95% - 96%)    Parameters below as of 19 Aug 2022 16:50  Patient On (Oxygen Delivery Method): room air      Daily     Daily   I&O's Summary    CAPILLARY BLOOD GLUCOSE        PHYSICAL EXAM:  GENERAL: NAD,    HEAD:  Atraumatic, Normocephalic  EYES: EOMI, PERRLA, conjunctiva and sclera clear  ENMT: No tonsillar erythema, exudates, or enlargement; Moist mucous membranes, Good dentition, No lesions  NECK: Supple, No JVD, Normal thyroid  NERVOUS SYSTEM:  Alert & Oriented X3, Good concentration; Motor Strength 5/5 B/L upper and lower extremities; DTRs 2+ intact and symmetric  CHEST/LUNG: Clear to percussion bilaterally; No rales, rhonchi, wheezing, or rubs  HEART: Regular rate and rhythm; No murmurs, rubs, or gallops  ABDOMEN: Soft, Nontender, Nondistended; Bowel sounds present  EXTREMITIES:  2+ Peripheral Pulses, No clubbing, cyanosis, or edema  LYMPH: No lymphadenopathy noted  SKIN: No rashes or lesions    Labs                          11.7   11.90 )-----------( 288      ( 20 Aug 2022 06:30 )             37.2     08-20    141  |  111<H>  |  34<H>  ----------------------------<  107<H>  3.8   |  28  |  0.68    Ca    8.7      20 Aug 2022 06:30    TPro  6.5  /  Alb  3.3  /  TBili  0.3  /  DBili  0.1  /  AST  27  /  ALT  60  /  AlkPhos  131<H>  08-20                        DVT prophylaxis: > Lovenox 40mg SQ daily  > Heparin   > SCD's

## 2022-08-21 PROCEDURE — 99232 SBSQ HOSP IP/OBS MODERATE 35: CPT

## 2022-08-21 RX ORDER — MORPHINE SULFATE 50 MG/1
2 CAPSULE, EXTENDED RELEASE ORAL ONCE
Refills: 0 | Status: DISCONTINUED | OUTPATIENT
Start: 2022-08-21 | End: 2022-08-21

## 2022-08-21 RX ORDER — LIDOCAINE 4 G/100G
1 CREAM TOPICAL EVERY 24 HOURS
Refills: 0 | Status: DISCONTINUED | OUTPATIENT
Start: 2022-08-21 | End: 2022-08-26

## 2022-08-21 RX ADMIN — OXYCODONE HYDROCHLORIDE 10 MILLIGRAM(S): 5 TABLET ORAL at 06:26

## 2022-08-21 RX ADMIN — LIDOCAINE 1 PATCH: 4 CREAM TOPICAL at 19:27

## 2022-08-21 RX ADMIN — CYCLOBENZAPRINE HYDROCHLORIDE 5 MILLIGRAM(S): 10 TABLET, FILM COATED ORAL at 00:45

## 2022-08-21 RX ADMIN — MORPHINE SULFATE 2 MILLIGRAM(S): 50 CAPSULE, EXTENDED RELEASE ORAL at 12:56

## 2022-08-21 RX ADMIN — OXYCODONE HYDROCHLORIDE 10 MILLIGRAM(S): 5 TABLET ORAL at 07:14

## 2022-08-21 RX ADMIN — CYCLOBENZAPRINE HYDROCHLORIDE 5 MILLIGRAM(S): 10 TABLET, FILM COATED ORAL at 07:58

## 2022-08-21 RX ADMIN — LIDOCAINE 1 PATCH: 4 CREAM TOPICAL at 21:27

## 2022-08-21 RX ADMIN — LIDOCAINE 1 PATCH: 4 CREAM TOPICAL at 09:49

## 2022-08-21 RX ADMIN — MORPHINE SULFATE 2 MILLIGRAM(S): 50 CAPSULE, EXTENDED RELEASE ORAL at 12:02

## 2022-08-21 RX ADMIN — OXYCODONE HYDROCHLORIDE 10 MILLIGRAM(S): 5 TABLET ORAL at 17:18

## 2022-08-21 RX ADMIN — OXYCODONE HYDROCHLORIDE 10 MILLIGRAM(S): 5 TABLET ORAL at 18:12

## 2022-08-21 RX ADMIN — SENNA PLUS 2 TABLET(S): 8.6 TABLET ORAL at 21:49

## 2022-08-21 NOTE — PROGRESS NOTE ADULT - SUBJECTIVE AND OBJECTIVE BOX
Patient is a 76y old  Female who presents with a chief complaint of c/c  back apin (20 Aug 2022 11:12)    INTERVAL HPI/OVERNIGHT EVENTS:    MEDICATIONS  (STANDING):  heparin   Injectable 5000 Unit(s) SubCutaneous every 12 hours  lidocaine   4% Patch 1 Patch Transdermal every 24 hours  morphine  - Injectable 2 milliGRAM(s) IV Push once  oxyCODONE  ER Tablet 10 milliGRAM(s) Oral every 12 hours  polyethylene glycol 3350 17 Gram(s) Oral daily  senna 2 Tablet(s) Oral at bedtime    MEDICATIONS  (PRN):  cyclobenzaprine 5 milliGRAM(s) Oral three times a day PRN Muscle Spasm  oxyCODONE    IR 5 milliGRAM(s) Oral every 8 hours PRN Moderate Pain (4 - 6)    Allergies    No Known Allergies    Intolerances      REVIEW OF SYSTEMS:  All other systems reviewed and are negative    Vital Signs Last 24 Hrs  T(C): 36.7 (21 Aug 2022 05:44), Max: 36.7 (21 Aug 2022 00:47)  T(F): 98 (21 Aug 2022 05:44), Max: 98 (21 Aug 2022 00:47)  HR: 81 (21 Aug 2022 05:44) (81 - 92)  BP: 149/66 (21 Aug 2022 05:44) (118/50 - 149/79)  BP(mean): --  RR: 18 (21 Aug 2022 05:44) (17 - 18)  SpO2: 95% (21 Aug 2022 05:44) (94% - 96%)      Daily     Daily   I&O's Summary    CAPILLARY BLOOD GLUCOSE        PHYSICAL EXAM:  GENERAL: NAD,    HEAD:  Atraumatic, Normocephalic  EYES: EOMI, PERRLA, conjunctiva and sclera clear  ENMT: No tonsillar erythema, exudates, or enlargement; Moist mucous membranes, Good dentition, No lesions  NECK: Supple, No JVD, Normal thyroid  NERVOUS SYSTEM:  Alert & Oriented X3, Good concentration; Motor Strength 5/5 B/L upper and lower extremities; DTRs 2+ intact and symmetric  CHEST/LUNG: Clear to percussion bilaterally; No rales, rhonchi, wheezing, or rubs  HEART: Regular rate and rhythm; No murmurs, rubs, or gallops  ABDOMEN: Soft, Nontender, Nondistended; Bowel sounds present  EXTREMITIES:  2+ Peripheral Pulses, No clubbing, cyanosis, or edema  LYMPH: No lymphadenopathy noted  SKIN: No rashes or lesions    Labs                          11.7   11.90 )-----------( 288      ( 20 Aug 2022 06:30 )             37.2     08-20    141  |  111<H>  |  34<H>  ----------------------------<  107<H>  3.8   |  28  |  0.68    Ca    8.7      20 Aug 2022 06:30    TPro  6.5  /  Alb  3.3  /  TBili  0.3  /  DBili  0.1  /  AST  27  /  ALT  60  /  AlkPhos  131<H>  08-20                        DVT prophylaxis: > Lovenox 40mg SQ daily  > Heparin   > SCD's

## 2022-08-21 NOTE — PROGRESS NOTE ADULT - ASSESSMENT
75 yo F     w/ spinal stenosis and osteoarthritis, R breast cancer      who presents with  h/o chronic   lower  back pain,  but over past 2 weeks has been getting worse.    Tuesday saw her rheumatologist who was concerned for fracture and prescribed her oxycodone and sent her to  where xrays were negative for fx.    pt  came to   e r for  worsening pain / denies any recent trauma or falls.    pt thinks  this might have all started when she was getting heavy groceries 2 weeks ago    Denies saddle anesthesia, urinary or fecal incontinence or retention, fevers, chills   .Lives alone, ambulates with walker       *  admitted with  worsening of her  c/.c  lower  back damian  Lumbar radiculopathy   pt is on pain meds  at home  xray  L spine,  report pending   on oxy/ flexeril  Still w intense pain today  CT Lumbar showed L2 lesion, will get MRI w cont, pt w hx of breast ca   add er oxycodone / pain patch   needs stair assessment      *  h/o  Ca breast  originally dx  in 1990's/  since then has had  recurrence  in    b/l breasts     per  pt, this recurrence has not required  chemo/ hormonal rx /  d r davida      *  elevated lfts   improved, can follow as outpatient        *  on dvt ppx/  s/q  heparin

## 2022-08-22 PROCEDURE — 99232 SBSQ HOSP IP/OBS MODERATE 35: CPT

## 2022-08-22 RX ORDER — ALPRAZOLAM 0.25 MG
0.25 TABLET ORAL THREE TIMES A DAY
Refills: 0 | Status: DISCONTINUED | OUTPATIENT
Start: 2022-08-22 | End: 2022-08-22

## 2022-08-22 RX ORDER — OXYCODONE HYDROCHLORIDE 5 MG/1
20 TABLET ORAL EVERY 12 HOURS
Refills: 0 | Status: DISCONTINUED | OUTPATIENT
Start: 2022-08-22 | End: 2022-08-23

## 2022-08-22 RX ORDER — OXYCODONE HYDROCHLORIDE 5 MG/1
10 TABLET ORAL EVERY 6 HOURS
Refills: 0 | Status: DISCONTINUED | OUTPATIENT
Start: 2022-08-22 | End: 2022-08-26

## 2022-08-22 RX ORDER — ALPRAZOLAM 0.25 MG
0.5 TABLET ORAL THREE TIMES A DAY
Refills: 0 | Status: DISCONTINUED | OUTPATIENT
Start: 2022-08-22 | End: 2022-08-26

## 2022-08-22 RX ORDER — OXYCODONE HYDROCHLORIDE 5 MG/1
15 TABLET ORAL EVERY 12 HOURS
Refills: 0 | Status: DISCONTINUED | OUTPATIENT
Start: 2022-08-22 | End: 2022-08-22

## 2022-08-22 RX ORDER — DIAZEPAM 5 MG
5 TABLET ORAL ONCE
Refills: 0 | Status: DISCONTINUED | OUTPATIENT
Start: 2022-08-22 | End: 2022-08-23

## 2022-08-22 RX ADMIN — POLYETHYLENE GLYCOL 3350 17 GRAM(S): 17 POWDER, FOR SOLUTION ORAL at 11:56

## 2022-08-22 RX ADMIN — OXYCODONE HYDROCHLORIDE 10 MILLIGRAM(S): 5 TABLET ORAL at 06:22

## 2022-08-22 RX ADMIN — Medication 0.25 MILLIGRAM(S): at 15:46

## 2022-08-22 RX ADMIN — OXYCODONE HYDROCHLORIDE 15 MILLIGRAM(S): 5 TABLET ORAL at 15:46

## 2022-08-22 RX ADMIN — OXYCODONE HYDROCHLORIDE 10 MILLIGRAM(S): 5 TABLET ORAL at 18:20

## 2022-08-22 RX ADMIN — LIDOCAINE 1 PATCH: 4 CREAM TOPICAL at 20:27

## 2022-08-22 RX ADMIN — OXYCODONE HYDROCHLORIDE 5 MILLIGRAM(S): 5 TABLET ORAL at 09:01

## 2022-08-22 RX ADMIN — OXYCODONE HYDROCHLORIDE 5 MILLIGRAM(S): 5 TABLET ORAL at 09:30

## 2022-08-22 RX ADMIN — LIDOCAINE 1 PATCH: 4 CREAM TOPICAL at 09:01

## 2022-08-22 RX ADMIN — OXYCODONE HYDROCHLORIDE 10 MILLIGRAM(S): 5 TABLET ORAL at 17:46

## 2022-08-22 RX ADMIN — SENNA PLUS 2 TABLET(S): 8.6 TABLET ORAL at 21:58

## 2022-08-22 RX ADMIN — LIDOCAINE 1 PATCH: 4 CREAM TOPICAL at 20:58

## 2022-08-22 NOTE — PROGRESS NOTE ADULT - ASSESSMENT
77 yo F w/ spinal stenosis and osteoarthritis, R breast cancer p/w chronic lower  back pain that has been getting worse.     Lumbar radiculopathy  - xray showed a lucency anterior aspect of L2 vertebral body, new from prior CT scan, suspicious for metastasis in this patient with prior history of breast cancer  - CT showed indeterminate heterogeneous mottled lucent lesion involving the L2 vertebral body  - increase Oxycontin and PRN oxycodone for breakthrough pain  - c/w lidocaine patch and flexeril    - will get MRI and give IV versed so she can tolerate it    Anxiety  - start Xanax    Constipation  - c/w Senna and Miralax     Elevated LFTS  - transaminases have normalized and elevated alk phos may be from bone mets- will check ggtp  - patient will need to follow as outpatient     Prophylaxis:  DVT: heparin  GI: PO diet

## 2022-08-22 NOTE — PROGRESS NOTE ADULT - SUBJECTIVE AND OBJECTIVE BOX
77 yo F w/ spinal stenosis and osteoarthritis, R breast cancer p/w chronic lower  back pain that has been getting worse. She is lying in bed in NAD.      MEDICATIONS  (STANDING):  heparin   Injectable 5000 Unit(s) SubCutaneous every 12 hours  lidocaine   4% Patch 1 Patch Transdermal every 24 hours  oxyCODONE  ER Tablet 15 milliGRAM(s) Oral every 12 hours  polyethylene glycol 3350 17 Gram(s) Oral daily  senna 2 Tablet(s) Oral at bedtime    MEDICATIONS  (PRN):  ALPRAZolam 0.5 milliGRAM(s) Oral three times a day PRN anxiety  cyclobenzaprine 5 milliGRAM(s) Oral three times a day PRN Muscle Spasm  oxyCODONE    IR 10 milliGRAM(s) Oral every 6 hours PRN Moderate Pain (4 - 6)      Allergies    No Known Allergies    Intolerances        Vital Signs Last 24 Hrs  T(C): 36.5 (22 Aug 2022 19:00), Max: 36.7 (22 Aug 2022 12:40)  T(F): 97.7 (22 Aug 2022 19:00), Max: 98 (22 Aug 2022 12:40)  HR: 106 (22 Aug 2022 19:00) (88 - 106)  BP: 165/71 (22 Aug 2022 19:00) (122/72 - 165/71)  BP(mean): --  RR: 18 (22 Aug 2022 19:00) (18 - 18)  SpO2: 98% (22 Aug 2022 19:00) (96% - 98%)    Parameters below as of 22 Aug 2022 19:00  Patient On (Oxygen Delivery Method): room air    PHYSICAL EXAM:  GENERAL: NAD, well-groomed, well-developed  HEAD:  Atraumatic, Normocephalic  EYES: EOMI, PERRLA   NECK: Supple   NERVOUS SYSTEM:  Alert & Oriented X3, Good concentration   CHEST/LUNG: Clear to auscultation bilaterally; No rales, rhonchi, wheezing, or rubs  HEART: Regular rate and rhythm; No murmurs, rubs, or gallops  ABDOMEN: Soft, Nontender, Nondistended; Bowel sounds present  EXTREMITIES:  No clubbing, cyanosis, or edema     LABS:       RADIOLOGY & ADDITIONAL TESTS:    08-22-22 @ 07:01  -  08-22-22 @ 19:22  --------------------------------------------------------  IN:  Total IN: 0 mL    OUT:    Voided (mL): 200 mL  Total OUT: 200 mL    Total NET: -200 mL

## 2022-08-23 LAB
ALBUMIN SERPL ELPH-MCNC: 3.4 G/DL — SIGNIFICANT CHANGE UP (ref 3.3–5)
ALP SERPL-CCNC: 129 U/L — HIGH (ref 40–120)
ALT FLD-CCNC: 44 U/L — SIGNIFICANT CHANGE UP (ref 12–78)
ANION GAP SERPL CALC-SCNC: 8 MMOL/L — SIGNIFICANT CHANGE UP (ref 5–17)
AST SERPL-CCNC: 16 U/L — SIGNIFICANT CHANGE UP (ref 15–37)
BILIRUB SERPL-MCNC: 0.4 MG/DL — SIGNIFICANT CHANGE UP (ref 0.2–1.2)
BUN SERPL-MCNC: 46 MG/DL — HIGH (ref 7–23)
CALCIUM SERPL-MCNC: 9.2 MG/DL — SIGNIFICANT CHANGE UP (ref 8.5–10.1)
CHLORIDE SERPL-SCNC: 109 MMOL/L — HIGH (ref 96–108)
CO2 SERPL-SCNC: 26 MMOL/L — SIGNIFICANT CHANGE UP (ref 22–31)
CREAT SERPL-MCNC: 0.67 MG/DL — SIGNIFICANT CHANGE UP (ref 0.5–1.3)
EGFR: 91 ML/MIN/1.73M2 — SIGNIFICANT CHANGE UP
GGT SERPL-CCNC: 110 U/L — HIGH (ref 8–40)
GLUCOSE SERPL-MCNC: 123 MG/DL — HIGH (ref 70–99)
HCT VFR BLD CALC: 39.1 % — SIGNIFICANT CHANGE UP (ref 34.5–45)
HGB BLD-MCNC: 12.6 G/DL — SIGNIFICANT CHANGE UP (ref 11.5–15.5)
LDH SERPL L TO P-CCNC: 192 U/L — SIGNIFICANT CHANGE UP (ref 50–242)
MAGNESIUM SERPL-MCNC: 2.4 MG/DL — SIGNIFICANT CHANGE UP (ref 1.6–2.6)
MCHC RBC-ENTMCNC: 25.5 PG — LOW (ref 27–34)
MCHC RBC-ENTMCNC: 32.2 G/DL — SIGNIFICANT CHANGE UP (ref 32–36)
MCV RBC AUTO: 79 FL — LOW (ref 80–100)
NRBC # BLD: 0 /100 WBCS — SIGNIFICANT CHANGE UP (ref 0–0)
PHOSPHATE SERPL-MCNC: 3.9 MG/DL — SIGNIFICANT CHANGE UP (ref 2.5–4.5)
PLATELET # BLD AUTO: 291 K/UL — SIGNIFICANT CHANGE UP (ref 150–400)
POTASSIUM SERPL-MCNC: 3.8 MMOL/L — SIGNIFICANT CHANGE UP (ref 3.5–5.3)
POTASSIUM SERPL-SCNC: 3.8 MMOL/L — SIGNIFICANT CHANGE UP (ref 3.5–5.3)
PROT SERPL-MCNC: 6.7 GM/DL — SIGNIFICANT CHANGE UP (ref 6–8.3)
RBC # BLD: 4.95 M/UL — SIGNIFICANT CHANGE UP (ref 3.8–5.2)
RBC # FLD: 16 % — HIGH (ref 10.3–14.5)
SODIUM SERPL-SCNC: 143 MMOL/L — SIGNIFICANT CHANGE UP (ref 135–145)
WBC # BLD: 16.47 K/UL — HIGH (ref 3.8–10.5)
WBC # FLD AUTO: 16.47 K/UL — HIGH (ref 3.8–10.5)

## 2022-08-23 PROCEDURE — 71045 X-RAY EXAM CHEST 1 VIEW: CPT | Mod: 26

## 2022-08-23 PROCEDURE — 99232 SBSQ HOSP IP/OBS MODERATE 35: CPT

## 2022-08-23 RX ORDER — HYDROMORPHONE HYDROCHLORIDE 2 MG/ML
2 INJECTION INTRAMUSCULAR; INTRAVENOUS; SUBCUTANEOUS ONCE
Refills: 0 | Status: DISCONTINUED | OUTPATIENT
Start: 2022-08-23 | End: 2022-08-23

## 2022-08-23 RX ORDER — OXYCODONE HYDROCHLORIDE 5 MG/1
20 TABLET ORAL EVERY 8 HOURS
Refills: 0 | Status: DISCONTINUED | OUTPATIENT
Start: 2022-08-23 | End: 2022-08-26

## 2022-08-23 RX ORDER — LACTULOSE 10 G/15ML
20 SOLUTION ORAL
Refills: 0 | Status: COMPLETED | OUTPATIENT
Start: 2022-08-23 | End: 2022-08-25

## 2022-08-23 RX ORDER — DIAZEPAM 5 MG
5 TABLET ORAL ONCE
Refills: 0 | Status: DISCONTINUED | OUTPATIENT
Start: 2022-08-23 | End: 2022-08-23

## 2022-08-23 RX ADMIN — OXYCODONE HYDROCHLORIDE 20 MILLIGRAM(S): 5 TABLET ORAL at 23:13

## 2022-08-23 RX ADMIN — LIDOCAINE 1 PATCH: 4 CREAM TOPICAL at 19:10

## 2022-08-23 RX ADMIN — OXYCODONE HYDROCHLORIDE 10 MILLIGRAM(S): 5 TABLET ORAL at 16:27

## 2022-08-23 RX ADMIN — LACTULOSE 20 GRAM(S): 10 SOLUTION ORAL at 16:52

## 2022-08-23 RX ADMIN — OXYCODONE HYDROCHLORIDE 10 MILLIGRAM(S): 5 TABLET ORAL at 17:27

## 2022-08-23 RX ADMIN — OXYCODONE HYDROCHLORIDE 10 MILLIGRAM(S): 5 TABLET ORAL at 00:42

## 2022-08-23 RX ADMIN — HYDROMORPHONE HYDROCHLORIDE 2 MILLIGRAM(S): 2 INJECTION INTRAMUSCULAR; INTRAVENOUS; SUBCUTANEOUS at 11:15

## 2022-08-23 RX ADMIN — Medication 5 MILLIGRAM(S): at 08:57

## 2022-08-23 RX ADMIN — OXYCODONE HYDROCHLORIDE 20 MILLIGRAM(S): 5 TABLET ORAL at 05:24

## 2022-08-23 RX ADMIN — OXYCODONE HYDROCHLORIDE 20 MILLIGRAM(S): 5 TABLET ORAL at 22:13

## 2022-08-23 RX ADMIN — HEPARIN SODIUM 5000 UNIT(S): 5000 INJECTION INTRAVENOUS; SUBCUTANEOUS at 16:51

## 2022-08-23 RX ADMIN — HYDROMORPHONE HYDROCHLORIDE 2 MILLIGRAM(S): 2 INJECTION INTRAMUSCULAR; INTRAVENOUS; SUBCUTANEOUS at 10:58

## 2022-08-23 RX ADMIN — OXYCODONE HYDROCHLORIDE 20 MILLIGRAM(S): 5 TABLET ORAL at 06:42

## 2022-08-23 RX ADMIN — SENNA PLUS 2 TABLET(S): 8.6 TABLET ORAL at 22:13

## 2022-08-23 RX ADMIN — LIDOCAINE 1 PATCH: 4 CREAM TOPICAL at 22:16

## 2022-08-23 RX ADMIN — OXYCODONE HYDROCHLORIDE 10 MILLIGRAM(S): 5 TABLET ORAL at 01:31

## 2022-08-23 RX ADMIN — LIDOCAINE 1 PATCH: 4 CREAM TOPICAL at 08:59

## 2022-08-23 RX ADMIN — POLYETHYLENE GLYCOL 3350 17 GRAM(S): 17 POWDER, FOR SOLUTION ORAL at 11:43

## 2022-08-23 RX ADMIN — Medication 5 MILLIGRAM(S): at 10:09

## 2022-08-23 NOTE — PROGRESS NOTE ADULT - SUBJECTIVE AND OBJECTIVE BOX
75 yo F w/ spinal stenosis and osteoarthritis, R breast cancer p/w chronic lower  back pain that has been getting worse. She is lying in bed in NAD.     MEDICATIONS  (STANDING):  heparin   Injectable 5000 Unit(s) SubCutaneous every 12 hours  lactulose Syrup 20 Gram(s) Oral two times a day  lidocaine   4% Patch 1 Patch Transdermal every 24 hours  oxyCODONE  ER Tablet 20 milliGRAM(s) Oral every 8 hours  polyethylene glycol 3350 17 Gram(s) Oral daily  senna 2 Tablet(s) Oral at bedtime    MEDICATIONS  (PRN):  ALPRAZolam 0.5 milliGRAM(s) Oral three times a day PRN anxiety  cyclobenzaprine 5 milliGRAM(s) Oral three times a day PRN Muscle Spasm  oxyCODONE    IR 10 milliGRAM(s) Oral every 6 hours PRN Moderate Pain (4 - 6)      Allergies    No Known Allergies    Intolerances        Vital Signs Last 24 Hrs  T(C): 36.8 (23 Aug 2022 12:20), Max: 36.8 (23 Aug 2022 12:20)  T(F): 98.2 (23 Aug 2022 12:20), Max: 98.2 (23 Aug 2022 12:20)  HR: 112 (23 Aug 2022 12:37) (101 - 120)  BP: 122/63 (23 Aug 2022 12:37) (122/63 - 182/68)  BP(mean): --  RR: 18 (23 Aug 2022 12:20) (18 - 18)  SpO2: 94% (23 Aug 2022 12:20) (94% - 96%)    Parameters below as of 23 Aug 2022 05:33  Patient On (Oxygen Delivery Method): room air        PHYSICAL EXAM:  GENERAL: NAD, well-groomed, well-developed  HEAD:  Atraumatic, Normocephalic  EYES: EOMI, PERRLA   NECK: Supple   NERVOUS SYSTEM:  Alert & Oriented X3, Good concentration   CHEST/LUNG: Clear to auscultation bilaterally; No rales, rhonchi, wheezing, or rubs  HEART: Regular rate and rhythm; No murmurs, rubs, or gallops  ABDOMEN: Soft, Nontender, Nondistended; Bowel sounds present  EXTREMITIES:  No clubbing, cyanosis, or edema      LABS:                        12.6   16.47 )-----------( 291      ( 23 Aug 2022 07:55 )             39.1     08-23    143  |  109<H>  |  46<H>  ----------------------------<  123<H>  3.8   |  26  |  0.67    Ca    9.2      23 Aug 2022 07:55  Phos  3.9     08-23  Mg     2.4     08-23    TPro  6.7  /  Alb  3.4  /  TBili  0.4  /  DBili  x   /  AST  16  /  ALT  44  /  AlkPhos  129<H>  08-23         RADIOLOGY & ADDITIONAL TESTS:    08-22-22 @ 07:01  -  08-23-22 @ 07:00  --------------------------------------------------------  IN:  Total IN: 0 mL    OUT:    Voided (mL): 400 mL  Total OUT: 400 mL    Total NET: -400 mL

## 2022-08-23 NOTE — PROGRESS NOTE ADULT - ASSESSMENT
75 yo F w/ spinal stenosis and osteoarthritis, R breast cancer p/w chronic lower  back pain that has been getting worse.     Lumbar radiculopathy  - xray showed a lucency anterior aspect of L2 vertebral body, new from prior CT scan, suspicious for metastasis in this patient with prior history of breast cancer  - CT showed indeterminate heterogeneous mottled lucent lesion involving the L2 vertebral body  - increase Oxycontin   - c/w PRN oxycodone for breakthrough pain  - c/w lidocaine patch and flexeril    - patient couldn't lie still for MRI today despite being given 10mg IV versed & 2mg IV Dilaudid- will need anesthesia's help. I called them today but couldn't reach anyone - will need to try again tomorrow    leukocytosis  - unclear cause - may reactive, will get cultures, UA and CXR    Anxiety  - c/w Xanax    Constipation  - c/w Senna and Miralax   - add lactulose    Elevated LFTS  - transaminases have normalized and elevated alk phos may be from bone mets- will check ggtp  - patient will need to follow as outpatient     Prophylaxis:  DVT: heparin  GI: PO diet

## 2022-08-24 DIAGNOSIS — R53.81 OTHER MALAISE: ICD-10-CM

## 2022-08-24 DIAGNOSIS — C50.919 MALIGNANT NEOPLASM OF UNSPECIFIED SITE OF UNSPECIFIED FEMALE BREAST: ICD-10-CM

## 2022-08-24 DIAGNOSIS — Z51.5 ENCOUNTER FOR PALLIATIVE CARE: ICD-10-CM

## 2022-08-24 DIAGNOSIS — M54.9 DORSALGIA, UNSPECIFIED: ICD-10-CM

## 2022-08-24 DIAGNOSIS — K59.00 CONSTIPATION, UNSPECIFIED: ICD-10-CM

## 2022-08-24 LAB
ALBUMIN SERPL ELPH-MCNC: 3.3 G/DL — SIGNIFICANT CHANGE UP (ref 3.3–5)
ALP SERPL-CCNC: 159 U/L — HIGH (ref 40–120)
ALT FLD-CCNC: 89 U/L — HIGH (ref 12–78)
ANION GAP SERPL CALC-SCNC: 7 MMOL/L — SIGNIFICANT CHANGE UP (ref 5–17)
AST SERPL-CCNC: 91 U/L — HIGH (ref 15–37)
BASOPHILS # BLD AUTO: 0.07 K/UL — SIGNIFICANT CHANGE UP (ref 0–0.2)
BASOPHILS NFR BLD AUTO: 0.5 % — SIGNIFICANT CHANGE UP (ref 0–2)
BILIRUB SERPL-MCNC: 0.5 MG/DL — SIGNIFICANT CHANGE UP (ref 0.2–1.2)
BUN SERPL-MCNC: 44 MG/DL — HIGH (ref 7–23)
CALCIUM SERPL-MCNC: 9.3 MG/DL — SIGNIFICANT CHANGE UP (ref 8.5–10.1)
CHLORIDE SERPL-SCNC: 107 MMOL/L — SIGNIFICANT CHANGE UP (ref 96–108)
CO2 SERPL-SCNC: 26 MMOL/L — SIGNIFICANT CHANGE UP (ref 22–31)
CREAT SERPL-MCNC: 0.8 MG/DL — SIGNIFICANT CHANGE UP (ref 0.5–1.3)
EGFR: 76 ML/MIN/1.73M2 — SIGNIFICANT CHANGE UP
EOSINOPHIL # BLD AUTO: 0.25 K/UL — SIGNIFICANT CHANGE UP (ref 0–0.5)
EOSINOPHIL NFR BLD AUTO: 1.8 % — SIGNIFICANT CHANGE UP (ref 0–6)
GLUCOSE SERPL-MCNC: 129 MG/DL — HIGH (ref 70–99)
HCT VFR BLD CALC: 40 % — SIGNIFICANT CHANGE UP (ref 34.5–45)
HGB BLD-MCNC: 13 G/DL — SIGNIFICANT CHANGE UP (ref 11.5–15.5)
IMM GRANULOCYTES NFR BLD AUTO: 0.5 % — SIGNIFICANT CHANGE UP (ref 0–1.5)
LYMPHOCYTES # BLD AUTO: 1.46 K/UL — SIGNIFICANT CHANGE UP (ref 1–3.3)
LYMPHOCYTES # BLD AUTO: 10.6 % — LOW (ref 13–44)
MAGNESIUM SERPL-MCNC: 2.5 MG/DL — SIGNIFICANT CHANGE UP (ref 1.6–2.6)
MCHC RBC-ENTMCNC: 26.1 PG — LOW (ref 27–34)
MCHC RBC-ENTMCNC: 32.5 G/DL — SIGNIFICANT CHANGE UP (ref 32–36)
MCV RBC AUTO: 80.2 FL — SIGNIFICANT CHANGE UP (ref 80–100)
MONOCYTES # BLD AUTO: 1.25 K/UL — HIGH (ref 0–0.9)
MONOCYTES NFR BLD AUTO: 9.1 % — SIGNIFICANT CHANGE UP (ref 2–14)
NEUTROPHILS # BLD AUTO: 10.63 K/UL — HIGH (ref 1.8–7.4)
NEUTROPHILS NFR BLD AUTO: 77.5 % — HIGH (ref 43–77)
NRBC # BLD: 0 /100 WBCS — SIGNIFICANT CHANGE UP (ref 0–0)
PHOSPHATE SERPL-MCNC: 4.2 MG/DL — SIGNIFICANT CHANGE UP (ref 2.5–4.5)
PLATELET # BLD AUTO: 285 K/UL — SIGNIFICANT CHANGE UP (ref 150–400)
POTASSIUM SERPL-MCNC: 4 MMOL/L — SIGNIFICANT CHANGE UP (ref 3.5–5.3)
POTASSIUM SERPL-SCNC: 4 MMOL/L — SIGNIFICANT CHANGE UP (ref 3.5–5.3)
PROCALCITONIN SERPL-MCNC: 0.07 NG/ML — SIGNIFICANT CHANGE UP (ref 0.02–0.1)
PROT SERPL-MCNC: 7.1 GM/DL — SIGNIFICANT CHANGE UP (ref 6–8.3)
RBC # BLD: 4.99 M/UL — SIGNIFICANT CHANGE UP (ref 3.8–5.2)
RBC # FLD: 15.9 % — HIGH (ref 10.3–14.5)
SODIUM SERPL-SCNC: 140 MMOL/L — SIGNIFICANT CHANGE UP (ref 135–145)
WBC # BLD: 13.73 K/UL — HIGH (ref 3.8–10.5)
WBC # FLD AUTO: 13.73 K/UL — HIGH (ref 3.8–10.5)

## 2022-08-24 PROCEDURE — 99233 SBSQ HOSP IP/OBS HIGH 50: CPT

## 2022-08-24 PROCEDURE — 99223 1ST HOSP IP/OBS HIGH 75: CPT | Mod: FS

## 2022-08-24 RX ORDER — ACETAMINOPHEN 500 MG
650 TABLET ORAL EVERY 6 HOURS
Refills: 0 | Status: DISCONTINUED | OUTPATIENT
Start: 2022-08-24 | End: 2022-08-26

## 2022-08-24 RX ORDER — ESCITALOPRAM OXALATE 10 MG/1
10 TABLET, FILM COATED ORAL DAILY
Refills: 0 | Status: DISCONTINUED | OUTPATIENT
Start: 2022-08-24 | End: 2022-08-26

## 2022-08-24 RX ORDER — DEXAMETHASONE 0.5 MG/5ML
4 ELIXIR ORAL
Refills: 0 | Status: DISCONTINUED | OUTPATIENT
Start: 2022-08-24 | End: 2022-08-26

## 2022-08-24 RX ADMIN — SENNA PLUS 2 TABLET(S): 8.6 TABLET ORAL at 21:40

## 2022-08-24 RX ADMIN — OXYCODONE HYDROCHLORIDE 10 MILLIGRAM(S): 5 TABLET ORAL at 08:50

## 2022-08-24 RX ADMIN — OXYCODONE HYDROCHLORIDE 20 MILLIGRAM(S): 5 TABLET ORAL at 22:10

## 2022-08-24 RX ADMIN — OXYCODONE HYDROCHLORIDE 20 MILLIGRAM(S): 5 TABLET ORAL at 14:47

## 2022-08-24 RX ADMIN — POLYETHYLENE GLYCOL 3350 17 GRAM(S): 17 POWDER, FOR SOLUTION ORAL at 11:27

## 2022-08-24 RX ADMIN — OXYCODONE HYDROCHLORIDE 20 MILLIGRAM(S): 5 TABLET ORAL at 21:40

## 2022-08-24 RX ADMIN — OXYCODONE HYDROCHLORIDE 10 MILLIGRAM(S): 5 TABLET ORAL at 01:56

## 2022-08-24 RX ADMIN — LACTULOSE 20 GRAM(S): 10 SOLUTION ORAL at 17:05

## 2022-08-24 RX ADMIN — OXYCODONE HYDROCHLORIDE 10 MILLIGRAM(S): 5 TABLET ORAL at 02:56

## 2022-08-24 RX ADMIN — OXYCODONE HYDROCHLORIDE 10 MILLIGRAM(S): 5 TABLET ORAL at 18:00

## 2022-08-24 RX ADMIN — OXYCODONE HYDROCHLORIDE 10 MILLIGRAM(S): 5 TABLET ORAL at 08:29

## 2022-08-24 RX ADMIN — OXYCODONE HYDROCHLORIDE 20 MILLIGRAM(S): 5 TABLET ORAL at 07:24

## 2022-08-24 RX ADMIN — Medication 5 MILLIGRAM(S): at 17:07

## 2022-08-24 RX ADMIN — LIDOCAINE 1 PATCH: 4 CREAM TOPICAL at 23:50

## 2022-08-24 RX ADMIN — LACTULOSE 20 GRAM(S): 10 SOLUTION ORAL at 05:44

## 2022-08-24 RX ADMIN — Medication 4 MILLIGRAM(S): at 17:05

## 2022-08-24 RX ADMIN — OXYCODONE HYDROCHLORIDE 20 MILLIGRAM(S): 5 TABLET ORAL at 13:49

## 2022-08-24 RX ADMIN — OXYCODONE HYDROCHLORIDE 20 MILLIGRAM(S): 5 TABLET ORAL at 05:44

## 2022-08-24 RX ADMIN — LIDOCAINE 1 PATCH: 4 CREAM TOPICAL at 11:26

## 2022-08-24 RX ADMIN — ESCITALOPRAM OXALATE 10 MILLIGRAM(S): 10 TABLET, FILM COATED ORAL at 11:26

## 2022-08-24 RX ADMIN — OXYCODONE HYDROCHLORIDE 10 MILLIGRAM(S): 5 TABLET ORAL at 17:04

## 2022-08-24 RX ADMIN — LIDOCAINE 1 PATCH: 4 CREAM TOPICAL at 19:41

## 2022-08-24 NOTE — CONSULT NOTE ADULT - PROBLEM SELECTOR RECOMMENDATION 4
lives alone and uses a rollator  severe back pain radiating to legs  on exam right leg weaker than left   PT eval appreciated   optimize pain management regimen

## 2022-08-24 NOTE — CONSULT NOTE ADULT - PROBLEM SELECTOR RECOMMENDATION 9
has chronic back pain worse x 2 weeks h/o OA and spinal stenosis  Oxycodone ER 20mg q 8 hours with oxycodone 10mg q 6 hours-3 doses of PRNs in 24 hours  lidocaine patch   will trial dexamethasone 4mg po 2 times per day for pain x 5 days   will add Tylenol 650mg q 6 hours PRN has chronic back pain worse x 2 weeks h/o OA and spinal stenosis, possible metastatic L2 lesion on CT, MRI pending.  Oxycodone ER 20mg q 8 hours with oxycodone 10mg q 6 hours-3 doses of PRNs in 24 hours  lidocaine patch   will trial dexamethasone 4mg po 2 times per day for pain x 5 days   will add Tylenol 650mg q 6 hours PRN has chronic back pain worse x 2 weeks h/o OA and spinal stenosis, possible metastatic L2 lesion on CT, MRI pending.    Oxycodone ER 20mg q 8 hours with oxycodone 10mg q 6 hours-3 doses of PRNs in 24 hours  lidocaine patch   will trial dexamethasone 4mg po 2 times per day for pain x 5 days   will add Tylenol 650mg q 6 hours PRN

## 2022-08-24 NOTE — PROGRESS NOTE ADULT - SUBJECTIVE AND OBJECTIVE BOX
Patient is a 76y old  Female who presents with a chief complaint of c/c  back apin (24 Aug 2022 11:56)    INTERVAL HPI/OVERNIGHT EVENTS: Patients seen and examined at bedside this morning. No acute events overnight. Pt reports    MEDICATIONS  (STANDING):  dexAMETHasone     Tablet 4 milliGRAM(s) Oral two times a day  escitalopram 10 milliGRAM(s) Oral daily  heparin   Injectable 5000 Unit(s) SubCutaneous every 12 hours  lactulose Syrup 20 Gram(s) Oral two times a day  lidocaine   4% Patch 1 Patch Transdermal every 24 hours  oxyCODONE  ER Tablet 20 milliGRAM(s) Oral every 8 hours  polyethylene glycol 3350 17 Gram(s) Oral daily  senna 2 Tablet(s) Oral at bedtime    MEDICATIONS  (PRN):  acetaminophen     Tablet .. 650 milliGRAM(s) Oral every 6 hours PRN Mild Pain (1 - 3), Moderate Pain (4 - 6)  ALPRAZolam 0.5 milliGRAM(s) Oral three times a day PRN anxiety  bisacodyl 5 milliGRAM(s) Oral every 12 hours PRN Constipation  cyclobenzaprine 5 milliGRAM(s) Oral three times a day PRN Muscle Spasm  oxyCODONE    IR 10 milliGRAM(s) Oral every 6 hours PRN Moderate Pain (4 - 6)    Allergies    No Known Allergies    Intolerances      REVIEW OF SYSTEMS:  All other systems reviewed and are negative    Vital Signs Last 24 Hrs  T(C): 36.7 (24 Aug 2022 11:02), Max: 37.1 (23 Aug 2022 23:45)  T(F): 98 (24 Aug 2022 11:02), Max: 98.8 (23 Aug 2022 23:45)  HR: 111 (24 Aug 2022 11:02) (95 - 111)  BP: 131/83 (24 Aug 2022 11:02) (127/64 - 166/74)  BP(mean): --  RR: 18 (24 Aug 2022 11:02) (16 - 18)  SpO2: 95% (24 Aug 2022 11:02) (94% - 95%)    Parameters below as of 24 Aug 2022 11:02  Patient On (Oxygen Delivery Method): room air      Daily     Daily   I&O's Summary    23 Aug 2022 07:01  -  24 Aug 2022 07:00  --------------------------------------------------------  IN: 50 mL / OUT: 0 mL / NET: 50 mL    24 Aug 2022 07:01  -  24 Aug 2022 16:14  --------------------------------------------------------  IN: 120 mL / OUT: 0 mL / NET: 120 mL      CAPILLARY BLOOD GLUCOSE        PHYSICAL EXAM:  GENERAL: NAD, age appropriate   HEAD:  Atraumatic, Normocephalic  EYES: EOMI, PERRLA, conjunctiva and sclera clear  ENMT: No tonsillar erythema, exudates, or enlargement; Moist mucous membranes, Good dentition, No lesions  NECK: Supple, No JVD, Normal thyroid  NERVOUS SYSTEM:  Alert & Oriented X3, Moderate concentration; Motor Strength 35 B/L upper and lower extremities; DTRs 2+ intact and symmetric  CHEST/LUNG: Clear to percussion bilaterally; No rales, rhonchi, wheezing, or rubs  HEART: Regular rate and rhythm; No murmurs, rubs, or gallops  ABDOMEN: Soft, Nontender, Nondistended; Bowel sounds present  EXTREMITIES:  2+ Peripheral Pulses, No clubbing, cyanosis, or edema  LYMPH: No lymphadenopathy noted  SKIN: No rashes or lesions    Labs                          13.0   13.73 )-----------( 285      ( 24 Aug 2022 08:18 )             40.0     08-24    140  |  107  |  44<H>  ----------------------------<  129<H>  4.0   |  26  |  0.80    Ca    9.3      24 Aug 2022 08:18  Phos  4.2     08-24  Mg     2.5     08-24    TPro  7.1  /  Alb  3.3  /  TBili  0.5  /  DBili  x   /  AST  91<H>  /  ALT  89<H>  /  AlkPhos  159<H>  08-24                         Patient is a 76y old  Female who presents with a chief complaint of c/c  back apin (24 Aug 2022 11:56)    INTERVAL HPI/OVERNIGHT EVENTS: Patients seen and examined at bedside this morning. No acute events overnight. Pt reports her pain is still present. 6/10 pain. Improved with current regimen. Not able to work with PT due to pain. Wants to go home and not to rehab afterwards.     MEDICATIONS  (STANDING):  dexAMETHasone     Tablet 4 milliGRAM(s) Oral two times a day  escitalopram 10 milliGRAM(s) Oral daily  heparin   Injectable 5000 Unit(s) SubCutaneous every 12 hours  lactulose Syrup 20 Gram(s) Oral two times a day  lidocaine   4% Patch 1 Patch Transdermal every 24 hours  oxyCODONE  ER Tablet 20 milliGRAM(s) Oral every 8 hours  polyethylene glycol 3350 17 Gram(s) Oral daily  senna 2 Tablet(s) Oral at bedtime    MEDICATIONS  (PRN):  acetaminophen     Tablet .. 650 milliGRAM(s) Oral every 6 hours PRN Mild Pain (1 - 3), Moderate Pain (4 - 6)  ALPRAZolam 0.5 milliGRAM(s) Oral three times a day PRN anxiety  bisacodyl 5 milliGRAM(s) Oral every 12 hours PRN Constipation  cyclobenzaprine 5 milliGRAM(s) Oral three times a day PRN Muscle Spasm  oxyCODONE    IR 10 milliGRAM(s) Oral every 6 hours PRN Moderate Pain (4 - 6)    Allergies    No Known Allergies    Intolerances      REVIEW OF SYSTEMS:  All other systems reviewed and are negative    Vital Signs Last 24 Hrs  T(C): 36.7 (24 Aug 2022 11:02), Max: 37.1 (23 Aug 2022 23:45)  T(F): 98 (24 Aug 2022 11:02), Max: 98.8 (23 Aug 2022 23:45)  HR: 111 (24 Aug 2022 11:02) (95 - 111)  BP: 131/83 (24 Aug 2022 11:02) (127/64 - 166/74)  BP(mean): --  RR: 18 (24 Aug 2022 11:02) (16 - 18)  SpO2: 95% (24 Aug 2022 11:02) (94% - 95%)    Parameters below as of 24 Aug 2022 11:02  Patient On (Oxygen Delivery Method): room air      Daily     Daily   I&O's Summary    23 Aug 2022 07:01  -  24 Aug 2022 07:00  --------------------------------------------------------  IN: 50 mL / OUT: 0 mL / NET: 50 mL    24 Aug 2022 07:01  -  24 Aug 2022 16:14  --------------------------------------------------------  IN: 120 mL / OUT: 0 mL / NET: 120 mL      CAPILLARY BLOOD GLUCOSE        PHYSICAL EXAM:  GENERAL: NAD, age appropriate   HEAD:  Atraumatic, Normocephalic  EYES: EOMI, PERRLA, conjunctiva and sclera clear  ENMT: No tonsillar erythema, exudates, or enlargement; Moist mucous membranes, Good dentition, No lesions  NECK: Supple, No JVD, Normal thyroid  NERVOUS SYSTEM:  Alert & Oriented X3, Moderate concentration; Motor Strength 35 B/L upper and lower extremities; DTRs 2+ intact and symmetric  CHEST/LUNG: Clear to percussion bilaterally; No rales, rhonchi, wheezing, or rubs  HEART: Regular rate and rhythm; No murmurs, rubs, or gallops  ABDOMEN: Soft, Nontender, Nondistended; Bowel sounds present  EXTREMITIES:  2+ Peripheral Pulses, No clubbing, cyanosis, or edema  LYMPH: No lymphadenopathy noted  SKIN: No rashes or lesions    Labs                          13.0   13.73 )-----------( 285      ( 24 Aug 2022 08:18 )             40.0     08-24    140  |  107  |  44<H>  ----------------------------<  129<H>  4.0   |  26  |  0.80    Ca    9.3      24 Aug 2022 08:18  Phos  4.2     08-24  Mg     2.5     08-24    TPro  7.1  /  Alb  3.3  /  TBili  0.5  /  DBili  x   /  AST  91<H>  /  ALT  89<H>  /  AlkPhos  159<H>  08-24

## 2022-08-24 NOTE — CONSULT NOTE ADULT - PROBLEM SELECTOR RECOMMENDATION 5
Spoke with patient who said she lives alone and tried to ascertain who would help with medical decision making in the event she was incapacitated.  She mentioned a friend, Alicia [phone], but said she has no formal HCP.  Discussed HCP form, but patient did not want to complete any HCP documentation at this time.  We further discussed MOLST form and left a blank copy for patient to review.  Patient did not have any notion on her wishes regarding advanced directives at this time.  Patient remains full code.  Palliative to continue to follow for GOC and symptom management.

## 2022-08-24 NOTE — CONSULT NOTE ADULT - PROBLEM SELECTOR RECOMMENDATION 3
follows with Dr. Mary Ugalde, breast surgeon at [facility]-last seen in the office in March 2022  reports she had breast cancer at 41 was on Tamoxifen.    Last year diagnosed with breast cancer again and had left patrial mastectomy and surgical path report from 6/21 showed invasive moderately differentiated ductal carcinoma with margins clear except for inferior margin which showed tiny focus of ductal carcinoma in situ (less than 1mm size)  patient has not seen medical oncology  consider heme-onc eval  concern for possible spinal mets on imaging-alk phos slightly elevated-pending MRI spine

## 2022-08-24 NOTE — PROGRESS NOTE ADULT - ASSESSMENT
75 yo F w/ spinal stenosis and osteoarthritis, R breast cancer p/w chronic lower  back pain that has been getting worse.     Lumbar radiculopathy  - xray showed a lucency anterior aspect of L2 vertebral body, new from prior CT scan, suspicious for metastasis in this patient with prior history of breast cancer  - CT showed indeterminate heterogeneous mottled lucent lesion involving the L2 vertebral body  - increase Oxycontin   - c/w PRN oxycodone for breakthrough pain  - c/w lidocaine patch and flexeril    - Palliative care consult for pain management  - steroid burst trial    Leukocytosis  - unclear cause - may reactive, will get cultures, UA and CXR    Anxiety  - c/w Xanax  - start lexapro 10 mg PO QHS    Constipation  - c/w Senna and Miralax   - add lactulose    Elevated LFTS  - transaminases have normalized and elevated alk phos may be from bone mets- will check ggtp  - patient will need to follow as outpatient heme F/U    Prophylaxis:  DVT: heparin  GI: PO diet 75 yo F w/ spinal stenosis and osteoarthritis, R breast cancer p/w chronic lower  back pain that has been getting worse.     Right breast cancer w/? spinal mets, w/Lumbar radiculopathy  - xray showed a lucency anterior aspect of L2 vertebral body, new from prior CT scan, suspicious for metastasis in this patient with prior history of breast cancer  - CT showed indeterminate heterogeneous mottled lucent lesion involving the L2 vertebral body  - increase Oxycontin   - c/w PRN oxycodone for breakthrough pain  - c/w lidocaine patch and flexeril    - Palliative care consult for pain management  - steroid burst trial  - Heme/Onc consult    Leukocytosis  - unclear cause - may reactive, will get cultures, UA and CXR    Anxiety  - c/w Xanax  - start lexapro 10 mg PO QHS    Constipation  - c/w Senna and Miralax   - add lactulose    Elevated LFTS  - transaminases have normalized and elevated alk phos may be from bone mets- will check ggtp    Prophylaxis:  DVT: heparin  GI: PO diet

## 2022-08-24 NOTE — CONSULT NOTE ADULT - PROBLEM SELECTOR RECOMMENDATION 2
no documented BMs  on lactulose, senna and Miralax  will add dulcolax PRN  monitor stool counts  high risk for constipation given she is sedentary and on opioids

## 2022-08-25 LAB
ANION GAP SERPL CALC-SCNC: 7 MMOL/L — SIGNIFICANT CHANGE UP (ref 5–17)
BUN SERPL-MCNC: 60 MG/DL — HIGH (ref 7–23)
CALCIUM SERPL-MCNC: 10.2 MG/DL — HIGH (ref 8.5–10.1)
CHLORIDE SERPL-SCNC: 108 MMOL/L — SIGNIFICANT CHANGE UP (ref 96–108)
CO2 SERPL-SCNC: 24 MMOL/L — SIGNIFICANT CHANGE UP (ref 22–31)
CREAT SERPL-MCNC: 0.94 MG/DL — SIGNIFICANT CHANGE UP (ref 0.5–1.3)
EGFR: 63 ML/MIN/1.73M2 — SIGNIFICANT CHANGE UP
GLUCOSE SERPL-MCNC: 149 MG/DL — HIGH (ref 70–99)
HCT VFR BLD CALC: 42.9 % — SIGNIFICANT CHANGE UP (ref 34.5–45)
HGB BLD-MCNC: 13.4 G/DL — SIGNIFICANT CHANGE UP (ref 11.5–15.5)
MCHC RBC-ENTMCNC: 25 PG — LOW (ref 27–34)
MCHC RBC-ENTMCNC: 31.2 G/DL — LOW (ref 32–36)
MCV RBC AUTO: 80.2 FL — SIGNIFICANT CHANGE UP (ref 80–100)
NRBC # BLD: 0 /100 WBCS — SIGNIFICANT CHANGE UP (ref 0–0)
PLATELET # BLD AUTO: 331 K/UL — SIGNIFICANT CHANGE UP (ref 150–400)
POTASSIUM SERPL-MCNC: 4.6 MMOL/L — SIGNIFICANT CHANGE UP (ref 3.5–5.3)
POTASSIUM SERPL-SCNC: 4.6 MMOL/L — SIGNIFICANT CHANGE UP (ref 3.5–5.3)
RBC # BLD: 5.35 M/UL — HIGH (ref 3.8–5.2)
RBC # FLD: 15.8 % — HIGH (ref 10.3–14.5)
SODIUM SERPL-SCNC: 139 MMOL/L — SIGNIFICANT CHANGE UP (ref 135–145)
WBC # BLD: 18.08 K/UL — HIGH (ref 3.8–10.5)
WBC # FLD AUTO: 18.08 K/UL — HIGH (ref 3.8–10.5)

## 2022-08-25 PROCEDURE — 99232 SBSQ HOSP IP/OBS MODERATE 35: CPT

## 2022-08-25 RX ADMIN — Medication 4 MILLIGRAM(S): at 05:12

## 2022-08-25 RX ADMIN — LIDOCAINE 1 PATCH: 4 CREAM TOPICAL at 11:08

## 2022-08-25 RX ADMIN — OXYCODONE HYDROCHLORIDE 20 MILLIGRAM(S): 5 TABLET ORAL at 13:43

## 2022-08-25 RX ADMIN — Medication 5 MILLIGRAM(S): at 11:07

## 2022-08-25 RX ADMIN — POLYETHYLENE GLYCOL 3350 17 GRAM(S): 17 POWDER, FOR SOLUTION ORAL at 11:08

## 2022-08-25 RX ADMIN — OXYCODONE HYDROCHLORIDE 20 MILLIGRAM(S): 5 TABLET ORAL at 21:24

## 2022-08-25 RX ADMIN — LACTULOSE 20 GRAM(S): 10 SOLUTION ORAL at 05:12

## 2022-08-25 RX ADMIN — SENNA PLUS 2 TABLET(S): 8.6 TABLET ORAL at 21:24

## 2022-08-25 RX ADMIN — OXYCODONE HYDROCHLORIDE 20 MILLIGRAM(S): 5 TABLET ORAL at 21:54

## 2022-08-25 RX ADMIN — LIDOCAINE 1 PATCH: 4 CREAM TOPICAL at 19:23

## 2022-08-25 RX ADMIN — OXYCODONE HYDROCHLORIDE 20 MILLIGRAM(S): 5 TABLET ORAL at 05:12

## 2022-08-25 RX ADMIN — OXYCODONE HYDROCHLORIDE 10 MILLIGRAM(S): 5 TABLET ORAL at 03:33

## 2022-08-25 RX ADMIN — HEPARIN SODIUM 5000 UNIT(S): 5000 INJECTION INTRAVENOUS; SUBCUTANEOUS at 05:12

## 2022-08-25 RX ADMIN — OXYCODONE HYDROCHLORIDE 20 MILLIGRAM(S): 5 TABLET ORAL at 14:37

## 2022-08-25 RX ADMIN — OXYCODONE HYDROCHLORIDE 10 MILLIGRAM(S): 5 TABLET ORAL at 03:05

## 2022-08-25 RX ADMIN — Medication 4 MILLIGRAM(S): at 17:04

## 2022-08-25 RX ADMIN — HEPARIN SODIUM 5000 UNIT(S): 5000 INJECTION INTRAVENOUS; SUBCUTANEOUS at 17:04

## 2022-08-25 RX ADMIN — LIDOCAINE 1 PATCH: 4 CREAM TOPICAL at 23:00

## 2022-08-25 RX ADMIN — OXYCODONE HYDROCHLORIDE 20 MILLIGRAM(S): 5 TABLET ORAL at 05:18

## 2022-08-25 RX ADMIN — ESCITALOPRAM OXALATE 10 MILLIGRAM(S): 10 TABLET, FILM COATED ORAL at 11:08

## 2022-08-25 NOTE — PROGRESS NOTE ADULT - SUBJECTIVE AND OBJECTIVE BOX
Patient is a 76y old  Female who presents with a chief complaint of c/c  back pain (25 Aug 2022 13:25)    INTERVAL HPI/OVERNIGHT EVENTS: Patients seen and examined at bedside this morning. No acute events overnight. Pt reports she is in alot of pain in her legs and back. Reports she can't feel like she can work with therapy in pain. Requesting to call her friend to relay the information. Pt can't quantify pain currently. States she sleeps alot on the pain medications.    MEDICATIONS  (STANDING):  dexAMETHasone     Tablet 4 milliGRAM(s) Oral two times a day  escitalopram 10 milliGRAM(s) Oral daily  heparin   Injectable 5000 Unit(s) SubCutaneous every 12 hours  lidocaine   4% Patch 1 Patch Transdermal every 24 hours  oxyCODONE  ER Tablet 20 milliGRAM(s) Oral every 8 hours  polyethylene glycol 3350 17 Gram(s) Oral daily  senna 2 Tablet(s) Oral at bedtime    MEDICATIONS  (PRN):  acetaminophen     Tablet .. 650 milliGRAM(s) Oral every 6 hours PRN Mild Pain (1 - 3), Moderate Pain (4 - 6)  ALPRAZolam 0.5 milliGRAM(s) Oral three times a day PRN anxiety  bisacodyl 5 milliGRAM(s) Oral every 12 hours PRN Constipation  cyclobenzaprine 5 milliGRAM(s) Oral three times a day PRN Muscle Spasm  oxyCODONE    IR 10 milliGRAM(s) Oral every 6 hours PRN Moderate Pain (4 - 6)    Allergies    No Known Allergies    Intolerances      REVIEW OF SYSTEMS:  All other systems reviewed and are negative    Vital Signs Last 24 Hrs  T(C): 36.8 (25 Aug 2022 11:40), Max: 37.2 (24 Aug 2022 17:03)  T(F): 98.2 (25 Aug 2022 11:40), Max: 99 (24 Aug 2022 17:03)  HR: 102 (25 Aug 2022 11:40) (84 - 109)  BP: 155/81 (25 Aug 2022 11:40) (119/67 - 155/81)  BP(mean): --  RR: 18 (25 Aug 2022 11:40) (17 - 18)  SpO2: 92% (25 Aug 2022 11:40) (92% - 94%)    Parameters below as of 25 Aug 2022 05:29  Patient On (Oxygen Delivery Method): room air      Daily     Daily   I&O's Summary    24 Aug 2022 07:01  -  25 Aug 2022 07:00  --------------------------------------------------------  IN: 120 mL / OUT: 0 mL / NET: 120 mL      CAPILLARY BLOOD GLUCOSE        PHYSICAL EXAM:  GENERAL: NAD, well-groomed, well-developed, anxious  HEAD:  Atraumatic, Normocephalic  EYES: EOMI, PERRLA, conjunctiva and sclera clear  ENMT: No tonsillar erythema, exudates, or enlargement; Moist mucous membranes, Good dentition, No lesions  NECK: Supple, No JVD, Normal thyroid  NERVOUS SYSTEM:  Alert & Oriented X3, Poor concentration; Motor Strength 3/5 B/L upper and lower extremities; DTRs 2+ intact and symmetric  CHEST/LUNG: Clear to percussion bilaterally; No rales, rhonchi, wheezing, or rubs  HEART: Regular rate and rhythm; No murmurs, rubs, or gallops  ABDOMEN: Soft, Nontender, Nondistended; Bowel sounds present  EXTREMITIES:  2+ Peripheral Pulses, No clubbing, cyanosis, or edema  LYMPH: No lymphadenopathy noted  SKIN: No rashes or lesions    Labs                          13.4   18.08 )-----------( 331      ( 25 Aug 2022 07:35 )             42.9     08-25    139  |  108  |  60<H>  ----------------------------<  149<H>  4.6   |  24  |  0.94    Ca    10.2<H>      25 Aug 2022 07:35  Phos  4.2     08-24  Mg     2.5     08-24    TPro  7.1  /  Alb  3.3  /  TBili  0.5  /  DBili  x   /  AST  91<H>  /  ALT  89<H>  /  AlkPhos  159<H>  08-24              Culture - Blood (collected 23 Aug 2022 21:35)  Source: .Blood Blood-Peripheral  Preliminary Report (25 Aug 2022 09:02):    No growth to date.    Culture - Blood (collected 23 Aug 2022 21:25)  Source: .Blood Blood-Peripheral  Preliminary Report (25 Aug 2022 09:02):    No growth to date.

## 2022-08-25 NOTE — PHYSICAL THERAPY INITIAL EVALUATION ADULT - IMPAIRED TRANSFERS: SIT/STAND, REHAB EVAL
impaired balance/narrow base of support/pain/decreased strength
impaired balance/narrow base of support/pain/decreased strength

## 2022-08-25 NOTE — PHYSICAL THERAPY INITIAL EVALUATION ADULT - NSPTDISCHREC_GEN_A_CORE
to further improve strength, balance and falls prevention. Patient demonstrates higher level of functional assistance on this encounter, compared to status pre-admission ./Sub-acute Rehab

## 2022-08-25 NOTE — PHYSICAL THERAPY INITIAL EVALUATION ADULT - CRITERIA FOR SKILLED THERAPEUTIC INTERVENTIONS
impairments found/functional limitations in following categories/risk reduction/prevention/anticipated equipment needs at discharge/anticipated discharge recommendation
impairments found/functional limitations in following categories/risk reduction/prevention/anticipated discharge recommendation

## 2022-08-25 NOTE — CONSULT NOTE ADULT - TIME BILLING
Evaluation of patient, review of medical records and collaboration with care team and patient
Evaluation of patient, review of medical records and collaboration with care team and patient
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
## 2022-08-25 NOTE — PHYSICAL THERAPY INITIAL EVALUATION ADULT - GENERAL OBSERVATIONS, REHAB EVAL
Patient sat up in bed c shoes on. PIV lock. Anxious, perseverant and crying she is in pain and not knowing how she will manage.
Patient supine in  ED stretcher. PIV lock

## 2022-08-25 NOTE — PHYSICAL THERAPY INITIAL EVALUATION ADULT - IMPAIRMENTS CONTRIBUTING TO GAIT DEVIATIONS, PT EVAL
impaired balance/narrow base of support/decreased strength
impaired balance/narrow base of support/decreased strength

## 2022-08-25 NOTE — PROGRESS NOTE ADULT - ASSESSMENT
75 yo F w/ spinal stenosis and osteoarthritis, R breast cancer p/w chronic lower  back pain that has been getting worse.     Right breast cancer w/? spinal mets, w/Lumbar radiculopathy  - xray showed a lucency anterior aspect of L2 vertebral body, new from prior CT scan, suspicious for metastasis in this patient with prior history of breast cancer  - CT showed indeterminate heterogeneous mottled lucent lesion involving the L2 vertebral body  - increase Oxycontin   - c/w PRN oxycodone for breakthrough pain  - c/w lidocaine patch and flexeril. Will try fentanyl patch if pain remains uncontrolled  - Palliative care consult for pain management  - steroid burst trial  - Heme/Onc consult: appreciated     Leukocytosis  - unclear cause - may reactive, will get cultures, UA and CXR    Anxiety  - c/w Xanax  - start lexapro 10 mg PO QHS    Constipation  - c/w Senna and Miralax   - add lactulose  - Improving. 2 small stools today     Prophylaxis:  DVT: heparin  GI: PO diet

## 2022-08-25 NOTE — CONSULT NOTE ADULT - SUBJECTIVE AND OBJECTIVE BOX
HPI:  76 year old woman with hx of back pain   spinal stenosis and osteoarthritis, h/o b/l  breast cancer  who presents with  h/o chronic lower back pain,  but over past 2 weeks has been getting worse.   .Lives alone, ambulates with walker       PERTINENT PM/SXH:   Breast Cancer (ICD9 174.9)    S/P Right Breast Biopsy (ICD9 V45.89)    History of Total Hysterectomy with Removal of Both Tubes and Ovaries (ICD9 V88.01)    Radiation Therapy (ICD9 V58.0)    Breast Cancer Right    Breast Cancer Left    Abnormal Breast Tissue    Osteoarthritis    Uterine cancer    Obesity      Status Post Breast Lumpectomy (ICD9 V45.89)    S/P Lumpectomy of Breast Right    S/P Lumpectomy of Breast Left    Breast Surgery    History of Hysterectomy    History of Total Hysterectomy with Removal of Both Tubes and Ovaries    Benign Cyst of Skin Left Shoulder Excision    History of Right Breast Biopsy    S/P Lumpectomy of Breast Left    H/O lumpectomy    History of total hip replacement, left      FAMILY HISTORY:  Family history of hypertension (Mother)    Family history of brain cancer (Father)    ITEMS NOT CHECKED ARE NOT PRESENT    SOCIAL HISTORY:   Significant other/partner:  [ ]  Children:  [ ]  Hoahaoism/Spirituality: Amish   Substance hx:  [ ]   Tobacco hx:  [ ]   Alcohol hx: [ ]   Home Opioid hx:  [ ] I-Stop Reference No: Reference #: [id number]    Others' Prescriptions  Patient Name: Ashley Prado Date: 1945  Address: [address] [place], [place] [address]Sex: Female  Rx Written	Rx Dispensed	Drug	Quantity	Days Supply	Prescriber Name	Prescriber Amrita #	Payment Method	Dispenser  08/16/2022	08/16/2022	oxycodone-acetaminophen 5-325 mg tablet	30	3	Mer Floyd MD	[id number]	Insurance	[id number]  Living Situation: [ ]Home  [ ]Long term care  [ ]Rehab [ ]Other    ADVANCE DIRECTIVES:    DNR  MOLST  [ ]  Living Will  [ ]   DECISION MAKER(s):  [ ] Health Care Proxy(s)  [ ] Surrogate(s)  [ ] Guardian           Name(s): Phone Number(s): Alicia (friend) [phone]    BASELINE (I)ADL(s) (prior to admission):  [place]: [x ]Total  [ ] Moderate [ ]Dependent    Allergies    No Known Allergies    Intolerances    MEDICATIONS  (STANDING):  escitalopram 10 milliGRAM(s) Oral daily  heparin   Injectable 5000 Unit(s) SubCutaneous every 12 hours  lactulose Syrup 20 Gram(s) Oral two times a day  lidocaine   4% Patch 1 Patch Transdermal every 24 hours  oxyCODONE  ER Tablet 20 milliGRAM(s) Oral every 8 hours  polyethylene glycol 3350 17 Gram(s) Oral daily  senna 2 Tablet(s) Oral at bedtime    MEDICATIONS  (PRN):  ALPRAZolam 0.5 milliGRAM(s) Oral three times a day PRN anxiety  cyclobenzaprine 5 milliGRAM(s) Oral three times a day PRN Muscle Spasm  oxyCODONE    IR 10 milliGRAM(s) Oral every 6 hours PRN Moderate Pain (4 - 6)    PRESENT SYMPTOMS: [ ]Unable to obtain due to poor mentation   Source if other than patient:  [ ]Family   [ ]Team     Pain: [x ] yes [ ] no  QOL impact - affects ability to walk   Location -  lower back and bilateral legs                 Aggravating factors -moving   Quality -vice-like  Radiation -  Timing-constant   Severity (0-10 scale):10/10  Minimal acceptable level (0-10 scale):     PAIN AD Score:     [email or web address] (press ctrl +  left click to view)    Dyspnea:                           [ ]Mild [ ]Moderate [ ]Severe  Anxiety:                             [ ]Mild [ ]Moderate [ ]Severe  Fatigue:                             [x ]Mild [ ]Moderate [ ]Severe  Nausea:                             [ ]Mild [ ]Moderate [ ]Severe  Loss of appetite:              [ ]Mild [ ]Moderate [ ]Severe  Constipation:                    [ ]Mild [x ]Moderate [ ]Severe    Other Symptoms:  [ x]All other review of systems negative     Karnofsky Performance Score/Palliative Performance Status Version 2:       40-50  %    http://npcrc.org/files/news/palliative_performance_scale_ppsv2.pdf  PHYSICAL EXAM:  Vital Signs Last 24 Hrs  T(C): 36.7 (24 Aug 2022 11:02), Max: 37.1 (23 Aug 2022 23:45)  T(F): 98 (24 Aug 2022 11:02), Max: 98.8 (23 Aug 2022 23:45)  HR: 111 (24 Aug 2022 11:02) (95 - 120)  BP: 131/83 (24 Aug 2022 11:02) (122/63 - 182/68)  BP(mean): --  RR: 18 (24 Aug 2022 11:02) (16 - 18)  SpO2: 95% (24 Aug 2022 11:02) (94% - 95%)    Parameters below as of 24 Aug 2022 11:02  Patient On (Oxygen Delivery Method): room air     I&O's Summary    23 Aug 2022 07:01  -  24 Aug 2022 07:00  --------------------------------------------------------  IN: 50 mL / OUT: 0 mL / NET: 50 mL    GENERAL:  [x ]Alert  [ ]Oriented x   [ ]Lethargic  [ ]Cachexia  [ ]Unarousable  [x ]Verbal  [ ]Non-Verbal  Behavioral:   [ ] Anxiety  [ ] Delirium [ ] Agitation [ ] Other  HEENT:  [ ]Normal   [ ]Dry mouth   [ ]ET Tube/Trach  [ ]Oral lesions  PULMONARY:   [x ]Clear [ ]Tachypnea  [ ]Audible excessive secretions   [ ]Rhonchi        [ ]Right [ ]Left [ ]Bilateral  [ ]Crackles        [ ]Right [ ]Left [ ]Bilateral  [ ]Wheezing     [ ]Right [ ]Left [ ]Bilateral  CARDIOVASCULAR:    [ ]Regular [ ]Irregular [x ]Tachy  [ ]Alexandre [ ]Murmur [ ]Other  GASTROINTESTINAL:  [x ]Soft  [ ]Distended   [ ]+BS  [x ]Non tender [ ]Tender  [ ]PEG [ ]OGT/ NGT  Last BM: ??? GENITOURINARY:  [ ]Normal [x ] Incontinent   [ ]Oliguria/Anuria   [ ]Ac  MUSCULOSKELETAL:   [ ]Normal   [ x]Weakness  [ ]Bed/Wheelchair bound [ ]Edema  NEUROLOGIC:   [ ]No focal deficits  [x ] Cognitive impairment forgetful at times [ ] Dysphagia [ ]Dysarthria [ ] Paresis [ ]Other   SKIN:   [ x]Normal   [ ]Pressure ulcer(s)  [ ]Rash    CRITICAL CARE:  [ ] Shock Present  [ ]Septic [ ]Cardiogenic [ ]Neurologic [ ]Hypovolemic  [ ]  Vasopressors [ ]  Inotropes   [ ] Respiratory failure present [ ] mechanical ventilation [ ] non-invasive ventilatory support [ ] High flow  [ ] Acute  [ ] Chronic [ ] Hypoxic  [ ] Hypercarbic [ ] Other  [ ] Other organ failure     LABS:                        13.0   13.73 )-----------( 285      ( 24 Aug 2022 08:18 )             40.0   08-24    140  |  107  |  44<H>  ----------------------------<  129<H>  4.0   |  26  |  0.80    Ca    9.3      24 Aug 2022 08:18  Phos  4.2     08-24  Mg     2.5     08-24    TPro  7.1  /  Alb  3.3  /  TBili  0.5  /  DBili  x   /  AST  91<H>  /  ALT  89<H>  /  AlkPhos  159<H>  08-24    RADIOLOGY & ADDITIONAL STUDIES:   < from: CT Lumbar Spine No Cont (08.20.22 @ 11:58) >   A mottled lucent lesion is seen within the L2 vertebral body   which appears extremely heterogeneous. No appreciable loss of height is   seen. There is no bony retropulsion. No gross epidural component is seen   extending into the spinal canal.  No acute fractures or dislocations are noted. There is grade 1   anterolisthesis of L4 and L5 without pars defects. Otherwise, the lumbar   alignment is maintained. Degenerative disc disease is seen within the   lower thoracic disc space levels with vacuum disc phenomenon. This also   affects of the T12-L1, L1-L2, L2-L3, and L3-L4 levels. Lower lumbar facet   arthrosis is seen.  Evaluation of the intraspinal contents is limited on CT modality.   Variable degrees of multilevel canal and foraminal narrowing are noted   secondary to degenerative changes and bulging discs.  Arterial vascular calcifications are seen.  A left-sided total hip arthroplasty is visualized.  IMPRESSION: Indeterminate heterogeneous mottled lucent lesion involving   the L2 vertebral body. No gross pathologic fracture or bony retropulsion.  Recommend further evaluation with a contrast-enhanced MRI study of the   lumbar spine to evaluate this lesion, provided there are no   contraindications to MRI.  --- End of Report ---  < end of copied text >    < from: Xray Lumbosacral Spine + Obliques (08.19.22 @ 10:12) >  IMPRESSION:  Lucency anterior aspect of L2 vertebral body, new from prior CT scan,   suspicious for metastasis in this patient with prior history of breast   cancer.  Significant multilevel degenerative changes throughout the visualized   spine.  Correlate with MRI.  --- End ofReport ---  < end of copied text >    PROTEIN CALORIE MALNUTRITION PRESENT: [ ] Yes [ ] No  [ ] PPSV2 < or = to 30% [ ] significant weight loss  [ ] poor nutritional intake [ ] catabolic state [ ] anasarca     Artificial Nutrition [ ]     REFERRALS:   [ ]Chaplaincy  [ ] Hospice  [ ]Child Life  [ ]Social Work  [ ]Case management [ ]Holistic Therapy     Goals of Care Document:    
HPI:  77 yo F     w/ spinal stenosis and osteoarthritis,   h/o b/l  breast cancer/  most recently  of  left breast  prior  to that, , it was her  right breast/  originally  dx  with breast  cancer > 15  yrs ago      who presents with  h/o chronic   lower  back pain,  but over past 2 weeks has been getting worse.    Tuesday saw her rheumatologist who was concerned for fracture and prescribed her oxycodone and sent her to  where xrays were negative for fx.    pt  came to   e r for  worsening pain / denies any recent trauma or falls.    pt thinks  this might have all started when she was getting heavy groceries 2 weeks ago    Denies saddle anesthesia, urinary or fecal incontinence or retention, fevers, chills   .Lives alone, ambulates with walker    (19 Aug 2022 13:46)    PERTINENT PM/SXH:   Breast Cancer (ICD9 174.9)    S/P Right Breast Biopsy (ICD9 V45.89)    History of Total Hysterectomy with Removal of Both Tubes and Ovaries (ICD9 V88.01)    Radiation Therapy (ICD9 V58.0)    Breast Cancer Right    Breast Cancer Left    Abnormal Breast Tissue    Osteoarthritis    Uterine cancer    Obesity      Status Post Breast Lumpectomy (ICD9 V45.89)    S/P Lumpectomy of Breast Right    S/P Lumpectomy of Breast Left    Breast Surgery    History of Hysterectomy    History of Total Hysterectomy with Removal of Both Tubes and Ovaries    Benign Cyst of Skin Left Shoulder Excision    History of Right Breast Biopsy    S/P Lumpectomy of Breast Left    H/O lumpectomy    History of total hip replacement, left      FAMILY HISTORY:  Family history of hypertension (Mother)    Family history of brain cancer (Father)    ITEMS NOT CHECKED ARE NOT PRESENT    SOCIAL HISTORY:   Significant other/partner:  [ ]  Children:  [ ]  Islam/Spirituality: Congregation   Substance hx:  [ ]   Tobacco hx:  [ ]   Alcohol hx: [ ]   Home Opioid hx:  [ ] I-Stop Reference No: Reference #: [id number]    Others' Prescriptions  Patient Name: Ashley DavenportBirth Date: 1945  [address] [address] [place], [place] [address]Sex: Female  Rx Written	Rx Dispensed	Drug	Quantity	Days Supply	Prescriber Name	Prescriber Amrita #	Payment Method	Dispenser  08/16/2022	08/16/2022	oxycodone-acetaminophen 5-325 mg tablet	30	3	Mer Floyd MD	[id number]	[id number]  Living Situation: [ ]Home  [ ]Long term care  [ ]Rehab [ ]Other    ADVANCE DIRECTIVES:    DNR  MOLST  [ ]  Living Will  [ ]   DECISION MAKER(s):  [ ] Health Care Proxy(s)  [ ] Surrogate(s)  [ ] Guardian           Name(s): Phone Number(s): Alicia (gerardo) [phone]    BASELINE (I)ADL(s) (prior to admission):  [place]: [x ]Total  [ ] Moderate [ ]Dependent    Allergies    No Known Allergies    Intolerances    MEDICATIONS  (STANDING):  escitalopram 10 milliGRAM(s) Oral daily  heparin   Injectable 5000 Unit(s) SubCutaneous every 12 hours  lactulose Syrup 20 Gram(s) Oral two times a day  lidocaine   4% Patch 1 Patch Transdermal every 24 hours  oxyCODONE  ER Tablet 20 milliGRAM(s) Oral every 8 hours  polyethylene glycol 3350 17 Gram(s) Oral daily  senna 2 Tablet(s) Oral at bedtime    MEDICATIONS  (PRN):  ALPRAZolam 0.5 milliGRAM(s) Oral three times a day PRN anxiety  cyclobenzaprine 5 milliGRAM(s) Oral three times a day PRN Muscle Spasm  oxyCODONE    IR 10 milliGRAM(s) Oral every 6 hours PRN Moderate Pain (4 - 6)    PRESENT SYMPTOMS: [ ]Unable to obtain due to poor mentation   Source if other than patient:  [ ]Family   [ ]Team     Pain: [x ] yes [ ] no  QOL impact - affects ability to walk   Location -  lower back and bilateral legs                 Aggravating factors -moving   Quality -vice-like  Radiation -  Timing-constant   Severity (0-10 scale):10/10  Minimal acceptable level (0-10 scale):     PAIN AD Score:     [email or web address] (press ctrl +  left click to view)    Dyspnea:                           [ ]Mild [ ]Moderate [ ]Severe  Anxiety:                             [ ]Mild [ ]Moderate [ ]Severe  Fatigue:                             [x ]Mild [ ]Moderate [ ]Severe  Nausea:                             [ ]Mild [ ]Moderate [ ]Severe  Loss of appetite:              [ ]Mild [ ]Moderate [ ]Severe  Constipation:                    [ ]Mild [x ]Moderate [ ]Severe    Other Symptoms:  [ x]All other review of systems negative     Karnofsky Performance Score/Palliative Performance Status Version 2:       40-50  %    http://npcrc.org/files/news/palliative_performance_scale_ppsv2.pdf  PHYSICAL EXAM:  Vital Signs Last 24 Hrs  T(C): 36.7 (24 Aug 2022 11:02), Max: 37.1 (23 Aug 2022 23:45)  T(F): 98 (24 Aug 2022 11:02), Max: 98.8 (23 Aug 2022 23:45)  HR: 111 (24 Aug 2022 11:02) (95 - 120)  BP: 131/83 (24 Aug 2022 11:02) (122/63 - 182/68)  BP(mean): --  RR: 18 (24 Aug 2022 11:02) (16 - 18)  SpO2: 95% (24 Aug 2022 11:02) (94% - 95%)    Parameters below as of 24 Aug 2022 11:02  Patient On (Oxygen Delivery Method): room air     I&O's Summary    23 Aug 2022 07:01  -  24 Aug 2022 07:00  --------------------------------------------------------  IN: 50 mL / OUT: 0 mL / NET: 50 mL    GENERAL:  [x ]Alert  [ ]Oriented x   [ ]Lethargic  [ ]Cachexia  [ ]Unarousable  [x ]Verbal  [ ]Non-Verbal  Behavioral:   [ ] Anxiety  [ ] Delirium [ ] Agitation [ ] Other  HEENT:  [ ]Normal   [ ]Dry mouth   [ ]ET Tube/Trach  [ ]Oral lesions  PULMONARY:   [x ]Clear [ ]Tachypnea  [ ]Audible excessive secretions   [ ]Rhonchi        [ ]Right [ ]Left [ ]Bilateral  [ ]Crackles        [ ]Right [ ]Left [ ]Bilateral  [ ]Wheezing     [ ]Right [ ]Left [ ]Bilateral  CARDIOVASCULAR:    [ ]Regular [ ]Irregular [x ]Tachy  [ ]Alexandre [ ]Murmur [ ]Other  GASTROINTESTINAL:  [x ]Soft  [ ]Distended   [ ]+BS  [x ]Non tender [ ]Tender  [ ]PEG [ ]OGT/ NGT  Last BM: ??? GENITOURINARY:  [ ]Normal [x ] Incontinent   [ ]Oliguria/Anuria   [ ]Ac  MUSCULOSKELETAL:   [ ]Normal   [ x]Weakness  [ ]Bed/Wheelchair bound [ ]Edema  NEUROLOGIC:   [ ]No focal deficits  [x ] Cognitive impairment forgetful at times [ ] Dysphagia [ ]Dysarthria [ ] Paresis [ ]Other   SKIN:   [ x]Normal   [ ]Pressure ulcer(s)  [ ]Rash    CRITICAL CARE:  [ ] Shock Present  [ ]Septic [ ]Cardiogenic [ ]Neurologic [ ]Hypovolemic  [ ]  Vasopressors [ ]  Inotropes   [ ] Respiratory failure present [ ] mechanical ventilation [ ] non-invasive ventilatory support [ ] High flow  [ ] Acute  [ ] Chronic [ ] Hypoxic  [ ] Hypercarbic [ ] Other  [ ] Other organ failure     LABS:                        13.0   13.73 )-----------( 285      ( 24 Aug 2022 08:18 )             40.0   08-24    140  |  107  |  44<H>  ----------------------------<  129<H>  4.0   |  26  |  0.80    Ca    9.3      24 Aug 2022 08:18  Phos  4.2     08-24  Mg     2.5     08-24    TPro  7.1  /  Alb  3.3  /  TBili  0.5  /  DBili  x   /  AST  91<H>  /  ALT  89<H>  /  AlkPhos  159<H>  08-24    RADIOLOGY & ADDITIONAL STUDIES:   < from: CT Lumbar Spine No Cont (08.20.22 @ 11:58) >   A mottled lucent lesion is seen within the L2 vertebral body   which appears extremely heterogeneous. No appreciable loss of height is   seen. There is no bony retropulsion. No gross epidural component is seen   extending into the spinal canal.  No acute fractures or dislocations are noted. There is grade 1   anterolisthesis of L4 and L5 without pars defects. Otherwise, the lumbar   alignment is maintained. Degenerative disc disease is seen within the   lower thoracic disc space levels with vacuum disc phenomenon. This also   affects of the T12-L1, L1-L2, L2-L3, and L3-L4 levels. Lower lumbar facet   arthrosis is seen.  Evaluation of the intraspinal contents is limited on CT modality.   Variable degrees of multilevel canal and foraminal narrowing are noted   secondary to degenerative changes and bulging discs.  Arterial vascular calcifications are seen.  A left-sided total hip arthroplasty is visualized.  IMPRESSION: Indeterminate heterogeneous mottled lucent lesion involving   the L2 vertebral body. No gross pathologic fracture or bony retropulsion.  Recommend further evaluation with a contrast-enhanced MRI study of the   lumbar spine to evaluate this lesion, provided there are no   contraindications to MRI.  --- End of Report ---  < end of copied text >    < from: Xray Lumbosacral Spine + Obliques (08.19.22 @ 10:12) >  IMPRESSION:  Lucency anterior aspect of L2 vertebral body, new from prior CT scan,   suspicious for metastasis in this patient with prior history of breast   cancer.  Significant multilevel degenerative changes throughout the visualized   spine.  Correlate with MRI.  --- End ofReport ---  < end of copied text >    PROTEIN CALORIE MALNUTRITION PRESENT: [ ] Yes [ ] No  [ ] PPSV2 < or = to 30% [ ] significant weight loss  [ ] poor nutritional intake [ ] catabolic state [ ] anasarca     Artificial Nutrition [ ]     REFERRALS:   [ ]Chaplaincy  [ ] Hospice  [ ]Child Life  [ ]Social Work  [ ]Case management [ ]Holistic Therapy     Goals of Care Document:

## 2022-08-25 NOTE — CONSULT NOTE ADULT - NS PANP COMMENT GEN_ALL_CORE FT
76 y F hx breast CA w recent recurrence s/p L partial mastectomy 6/21, last seen by breast surgeon 3/22, has not had onc f/u per pt adm w acute on chronic LBP, hx OA and spinal stenosis. ISTOP reviewed, on percocet 5/325mg every 4h prn. CT shows poss metastatic lesion at L2, no epidural involvement,  MRI pending. Denies numbness, saddle anesthesia. Pain recs as above. Bowel regimen on opioids, no BM recorded. Pt unable to designate a HCP at this time or provide wishes regarding advance directives. Palliative will follow for ongoing GOC discussions.
76 y F hx breast CA w recent recurrence s/p L partial mastectomy 6/21, last seen by breast surgeon 3/22, has not had onc f/u per pt adm w acute on chronic LBP, hx OA and spinal stenosis. ISTOP reviewed, on percocet 5/325mg every 4h prn. CT shows poss metastatic lesion at L2, no epidural involvement,  MRI pending. Denies numbness, saddle anesthesia. Pain recs as above. Bowel regimen on opioids, no BM recorded. Pt unable to designate a HCP at this time or provide wishes regarding advance directives. Palliative will follow for ongoing GOC discussions.

## 2022-08-25 NOTE — PHYSICAL THERAPY INITIAL EVALUATION ADULT - REFERRAL TO ANOTHER SERVICE NEEDED, PT EVAL
*MS Teams communication made to Dr. Avitia regarding patient agreement to speak to 'someone about (her) anxiety'./psych, pain management

## 2022-08-25 NOTE — PHYSICAL THERAPY INITIAL EVALUATION ADULT - PERSONAL SAFETY AND JUDGMENT, REHAB EVAL
pain-avoidant; defers assessment at stairs due to reported pain and inability to walk/impaired
pain-avoidant; defers assessment at stairs due to reported pain and inability to walk/impaired

## 2022-08-25 NOTE — PHYSICAL THERAPY INITIAL EVALUATION ADULT - ADDITIONAL COMMENTS
Per patient, lives alone in private house c 5 stair steps to enter c handrail; 2 steps inside c handrails. Used a cane prior to admission; owns 2 rolling walkers and a rollator but only uses latter. Reports does not use walker inside home as was told by orthopedic surgeon in 2019 to use a cane. Later stated walkers do not fit in her home. Denies falls. Denies any social or family support.
Per patient, lives alone in private house c 5 stair steps to enter c handrail; 2 steps inside c handrails. Used a cane prior to admission; owns 2 rolling walkers and a rollator but only uses latter. Reports does not use walker inside home as was told by orthopedic surgeon in 2019 to use a cane. Later stated walkers do not fit in her home. Denies falls. Denies any social or family support.

## 2022-08-25 NOTE — PHYSICAL THERAPY INITIAL EVALUATION ADULT - TRANSFER SAFETY CONCERNS NOTED: SIT/STAND, REHAB EVAL
decreased balance during turns/decreased safety awareness/decreased sequencing ability/decreased weight-shifting ability
decreased balance during turns/decreased safety awareness/decreased sequencing ability/decreased weight-shifting ability

## 2022-08-25 NOTE — CONSULT NOTE ADULT - ASSESSMENT
76 year old female PMH of breast ca with recent left partial mastectomy, OA and spinal stenosis presents to the ED for 2 weeks of acute on chronic back pain radiating to her legs.  Imagining concerning for possible spinal mets.  Palliative consulted for assistance with pain management and GOC.  
76 year old female PMH of breast ca with recent left partial mastectomy, OA and spinal stenosis presents to the ED for 2 weeks of acute on chronic back pain radiating to her legs.  Imagining concerning for possible spinal mets.     metastatic breast cancer with mets to spine  would benefit from radiation therapy as out patient for symptomatic relief  need neurosurgery eval   we need to get path report ie ER /KS and her 2 nue receptor status and may give harmome tx vs chemotherapy as out patient  patient has not seen oncologist recently as out patient   pain management   bowel regimen

## 2022-08-26 ENCOUNTER — TRANSCRIPTION ENCOUNTER (OUTPATIENT)
Age: 77
End: 2022-08-26

## 2022-08-26 VITALS
HEART RATE: 95 BPM | TEMPERATURE: 98 F | DIASTOLIC BLOOD PRESSURE: 73 MMHG | RESPIRATION RATE: 17 BRPM | OXYGEN SATURATION: 94 % | SYSTOLIC BLOOD PRESSURE: 146 MMHG

## 2022-08-26 LAB
FLUAV AG NPH QL: SIGNIFICANT CHANGE UP
FLUBV AG NPH QL: SIGNIFICANT CHANGE UP
GLUCOSE BLDC GLUCOMTR-MCNC: 130 MG/DL — HIGH (ref 70–99)
SARS-COV-2 RNA SPEC QL NAA+PROBE: SIGNIFICANT CHANGE UP

## 2022-08-26 PROCEDURE — 99239 HOSP IP/OBS DSCHRG MGMT >30: CPT

## 2022-08-26 PROCEDURE — 99233 SBSQ HOSP IP/OBS HIGH 50: CPT

## 2022-08-26 PROCEDURE — 99497 ADVNCD CARE PLAN 30 MIN: CPT

## 2022-08-26 PROCEDURE — 90792 PSYCH DIAG EVAL W/MED SRVCS: CPT

## 2022-08-26 RX ORDER — SENNA PLUS 8.6 MG/1
2 TABLET ORAL
Qty: 0 | Refills: 0 | DISCHARGE
Start: 2022-08-26

## 2022-08-26 RX ORDER — DEXAMETHASONE 0.5 MG/5ML
1 ELIXIR ORAL
Qty: 0 | Refills: 0 | DISCHARGE
Start: 2022-08-26

## 2022-08-26 RX ORDER — ACETAMINOPHEN 500 MG
2 TABLET ORAL
Qty: 0 | Refills: 0 | DISCHARGE
Start: 2022-08-26

## 2022-08-26 RX ORDER — OXYCODONE HYDROCHLORIDE 5 MG/1
1 TABLET ORAL
Qty: 0 | Refills: 0 | DISCHARGE
Start: 2022-08-26

## 2022-08-26 RX ORDER — ESCITALOPRAM OXALATE 10 MG/1
1 TABLET, FILM COATED ORAL
Qty: 0 | Refills: 0 | DISCHARGE
Start: 2022-08-26

## 2022-08-26 RX ORDER — IBUPROFEN 200 MG
1 TABLET ORAL
Qty: 0 | Refills: 0 | DISCHARGE

## 2022-08-26 RX ORDER — POLYETHYLENE GLYCOL 3350 17 G/17G
17 POWDER, FOR SOLUTION ORAL
Qty: 0 | Refills: 0 | DISCHARGE
Start: 2022-08-26

## 2022-08-26 RX ORDER — LIDOCAINE 4 G/100G
1 CREAM TOPICAL
Qty: 0 | Refills: 0 | DISCHARGE
Start: 2022-08-26

## 2022-08-26 RX ORDER — CYCLOBENZAPRINE HYDROCHLORIDE 10 MG/1
1 TABLET, FILM COATED ORAL
Qty: 0 | Refills: 0 | DISCHARGE
Start: 2022-08-26

## 2022-08-26 RX ORDER — ALPRAZOLAM 0.25 MG
1 TABLET ORAL
Qty: 0 | Refills: 0 | DISCHARGE
Start: 2022-08-26

## 2022-08-26 RX ADMIN — Medication 4 MILLIGRAM(S): at 17:28

## 2022-08-26 RX ADMIN — ESCITALOPRAM OXALATE 10 MILLIGRAM(S): 10 TABLET, FILM COATED ORAL at 11:04

## 2022-08-26 RX ADMIN — OXYCODONE HYDROCHLORIDE 20 MILLIGRAM(S): 5 TABLET ORAL at 13:02

## 2022-08-26 RX ADMIN — Medication 4 MILLIGRAM(S): at 05:21

## 2022-08-26 RX ADMIN — HEPARIN SODIUM 5000 UNIT(S): 5000 INJECTION INTRAVENOUS; SUBCUTANEOUS at 05:21

## 2022-08-26 RX ADMIN — OXYCODONE HYDROCHLORIDE 20 MILLIGRAM(S): 5 TABLET ORAL at 05:25

## 2022-08-26 RX ADMIN — HEPARIN SODIUM 5000 UNIT(S): 5000 INJECTION INTRAVENOUS; SUBCUTANEOUS at 17:28

## 2022-08-26 RX ADMIN — LIDOCAINE 1 PATCH: 4 CREAM TOPICAL at 11:05

## 2022-08-26 RX ADMIN — OXYCODONE HYDROCHLORIDE 20 MILLIGRAM(S): 5 TABLET ORAL at 14:25

## 2022-08-26 RX ADMIN — POLYETHYLENE GLYCOL 3350 17 GRAM(S): 17 POWDER, FOR SOLUTION ORAL at 11:08

## 2022-08-26 RX ADMIN — OXYCODONE HYDROCHLORIDE 20 MILLIGRAM(S): 5 TABLET ORAL at 05:20

## 2022-08-26 NOTE — DISCHARGE NOTE NURSING/CASE MANAGEMENT/SOCIAL WORK - PATIENT PORTAL LINK FT
You can access the FollowMyHealth Patient Portal offered by [facility] by registering at the following website: [email or web address]. By joining [employer]’s FollowMyHealth portal, you will also be able to view your health information using other applications (apps) compatible with our system.

## 2022-08-26 NOTE — PROGRESS NOTE ADULT - TIME BILLING
Lab test review, Radiology Review, Vitals review, Consultant review and discussion, Physical examination, IDR, Assessment and plan; Plan discussion with patient and family
Lab test review, Radiology Review, Vitals review, Consultant review and discussion, Physical examination, IDR, Assessment and plan; Plan discussion with patient and family
Evaluation of patient, review of medical records and collaboration with care team and patient
Evaluation of patient, review of medical records and collaboration with care team and patient

## 2022-08-26 NOTE — PROGRESS NOTE ADULT - REASON FOR ADMISSION
c/c  back aidann
c/c  back aidann
metastatic malignancy
c/c  back aidann
c/c  back pain
c/c  back aidann
c/c  back aidann

## 2022-08-26 NOTE — DISCHARGE NOTE PROVIDER - NSDCCPCAREPLAN_GEN_ALL_CORE_FT
PRINCIPAL DISCHARGE DIAGNOSIS  Diagnosis: Intractable back pain  Assessment and Plan of Treatment:

## 2022-08-26 NOTE — BH CONSULTATION LIAISON ASSESSMENT NOTE - RISK ASSESSMENT
Chronic risk factors: single, age > 65 yrs, significant medical issues including metastatic disease; suspected Borderline Personality. Protective factors: female gender, no formal psychiatric hx; medication and treatment compliant; denies suicide attempts; no self-injurious behavior; denies substance use; no hx of aggression/violence; no legal issues; stable domicile; articulate; strong family support; access to health services. Acute risk factors identified - presented with acute worsened distressing pain which was successfully controlled with Pain Management thus reducing acute risk

## 2022-08-26 NOTE — PROGRESS NOTE ADULT - PROVIDER SPECIALTY LIST ADULT
Hospitalist
Heme/Onc
Palliative Care

## 2022-08-26 NOTE — DISCHARGE NOTE PROVIDER - CARE PROVIDER_API CALL
Michelle Jo  HEMATOLOGY  [address] [place] [address]  Phone: [phone]  Fax: [phone]  Follow Up Time: 2 weeks

## 2022-08-26 NOTE — PROGRESS NOTE ADULT - ASSESSMENT
76 year old female PMH of breast ca with recent left partial mastectomy, OA and spinal stenosis presents to the ED for 2 weeks of acute on chronic back pain radiating to her legs.  Imagining concerning for possible spinal mets.     likely metastatic breast cancer with mets to spine  would benefit from radiation therapy as out patient for symptomatic relief  need neurosurgery eval if bone biopsy can be done   we need to get path report ie ER /IN and her 2 nue receptor status and may give harmome tx vs chemotherapy as out patient  patient has not seen oncologist recently as out patient   pain management   bowel regimen   patient lives alone have not seen oncologist as out patient may be in denial palliative care to determine goals of care

## 2022-08-26 NOTE — PROGRESS NOTE ADULT - ASSESSMENT
76 year old female PMH of breast ca with recent left partial mastectomy, OA and spinal stenosis presents to the ED for 2 weeks of acute on chronic back pain radiating to her legs.  Imagining concerning for possible spinal mets.  Palliative consulted for assistance with pain management and GOC.

## 2022-08-26 NOTE — BH CONSULTATION LIAISON ASSESSMENT NOTE - NSICDXPASTMEDICALHX_GEN_ALL_CORE_FT
PAST MEDICAL HISTORY:  Breast Cancer (ICD9 174.9)     Breast Cancer Left NO B/P IV BLOOD DRAW LEFT ARM    Breast Cancer Right     Obesity     Osteoarthritis     Radiation Therapy (ICD9 V58.0) mammosite/balloon left breast for radiation treatment    Uterine cancer h/o     no

## 2022-08-26 NOTE — BH CONSULTATION LIAISON ASSESSMENT NOTE - CURRENT MEDICATION
MEDICATIONS  (STANDING):  dexAMETHasone     Tablet 4 milliGRAM(s) Oral two times a day  escitalopram 10 milliGRAM(s) Oral daily  heparin   Injectable 5000 Unit(s) SubCutaneous every 12 hours  lidocaine   4% Patch 1 Patch Transdermal every 24 hours  oxyCODONE  ER Tablet 20 milliGRAM(s) Oral every 8 hours  polyethylene glycol 3350 17 Gram(s) Oral daily  senna 2 Tablet(s) Oral at bedtime    MEDICATIONS  (PRN):  acetaminophen     Tablet .. 650 milliGRAM(s) Oral every 6 hours PRN Mild Pain (1 - 3), Moderate Pain (4 - 6)  ALPRAZolam 0.5 milliGRAM(s) Oral three times a day PRN anxiety  bisacodyl 5 milliGRAM(s) Oral every 12 hours PRN Constipation  cyclobenzaprine 5 milliGRAM(s) Oral three times a day PRN Muscle Spasm  oxyCODONE    IR 10 milliGRAM(s) Oral every 6 hours PRN Moderate Pain (4 - 6)

## 2022-08-26 NOTE — BH CONSULTATION LIAISON ASSESSMENT NOTE - NSBHCONSULTFOLLOWAFTERCARE_PSY_A_CORE FT
going to an [facility] later this afternoon where she can request to speak to a therapist/Psychiatry consultant PRN

## 2022-08-26 NOTE — DISCHARGE NOTE NURSING/CASE MANAGEMENT/SOCIAL WORK - NSDCPEFALRISK_GEN_ALL_CORE
For information on Fall & Injury Prevention, visit: [email or web address] OR  [email or web address] OR  https://www.cdc.gov/steadi/patient.html

## 2022-08-26 NOTE — BH CONSULTATION LIAISON ASSESSMENT NOTE - NSBHCONSULTRECOMMENDOTHER_PSY_A_CORE FT
- continue current Pain Management as it is controlling Pt's pain successfully   - has capacity to make her medical decisions

## 2022-08-26 NOTE — BH CONSULTATION LIAISON ASSESSMENT NOTE - OTHER
dismayed, somewhat irritable - consistent with Cluster B "what do you think?"  at times loudly speaking in an annoyed, dismayed tone  deferred  high end of fair

## 2022-08-26 NOTE — BH CONSULTATION LIAISON ASSESSMENT NOTE - SUMMARY
Suspecting Borderline Personality Disorder, characteristics of which is more predominant in a highly stressful situation and physical pain, being given cancer diagnosis with overall limited prognosis.

## 2022-08-26 NOTE — BH CONSULTATION LIAISON ASSESSMENT NOTE - NSBHCHARTREVIEWVS_PSY_A_CORE FT
Vital Signs Last 24 Hrs  T(C): 36.4 (26 Aug 2022 11:47), Max: 36.6 (26 Aug 2022 00:03)  T(F): 97.6 (26 Aug 2022 11:47), Max: 97.8 (26 Aug 2022 00:03)  HR: 102 (26 Aug 2022 11:47) (88 - 123)  BP: 156/77 (26 Aug 2022 11:47) (117/67 - 162/92)  BP(mean): --  RR: 18 (26 Aug 2022 11:47) (18 - 18)  SpO2: 94% (26 Aug 2022 11:47) (94% - 95%)    Parameters below as of 26 Aug 2022 11:47  Patient On (Oxygen Delivery Method): room air

## 2022-08-26 NOTE — DISCHARGE NOTE PROVIDER - HOSPITAL COURSE
77 yo F w/ spinal stenosis and osteoarthritis, R breast cancer p/w chronic lower  back pain now stable. Patient is medically stable for discharge to rehab.     Right breast cancer w/? spinal mets, w/Lumbar radiculopathy  - xray showed a lucency anterior aspect of L2 vertebral body, new from prior CT scan, suspicious for metastasis in this patient with prior history of breast cancer  - CT showed indeterminate heterogeneous mottled lucent lesion involving the L2 vertebral body  - increase Oxycontin   - c/w PRN oxycodone for breakthrough pain  - c/w lidocaine patch and flexeril. Will try fentanyl patch if pain remains uncontrolled  - Palliative care consult for pain management  - steroid burst trial  - Heme/Onc consult: appreciated   - likely metastatic breast cancer with mets to spine  would benefit from radiation therapy as out patient for symptomatic relief  need neurosurgery eval if bone biopsy can be done as outpatient  we need to get path report ie ER /AL and her 2 nue receptor status and may give harmome tx vs chemotherapy as out patient. patient has not seen oncologist recently as out patient   - Patient will need outpatient open MRI as per sever claustrophobia inhibited imaging during hospital stay       Leukocytosis  - 2/2 underlying cancer     Anxiety  - c/w Xanax  - lexapro 10 mg PO QHS  - patient seen by psychiatrist inpatient     Constipation  - c/w Senna and Miralax     Prophylaxis:  DVT: heparin  GI: PO diet

## 2022-08-26 NOTE — BH CONSULTATION LIAISON ASSESSMENT NOTE - NSBHCHARTREVIEWLAB_PSY_A_CORE FT
08-25    139  |  108  |  60<H>  ----------------------------<  149<H>  4.6   |  24  |  0.94    Ca    10.2<H>      25 Aug 2022 07:35

## 2022-08-26 NOTE — PROGRESS NOTE ADULT - ASSESSMENT
77 yo F w/ spinal stenosis and osteoarthritis, R breast cancer p/w chronic lower  back pain that has been getting worse and now relatively stable.     Right breast cancer w/? spinal mets, w/Lumbar radiculopathy  - xray showed a lucency anterior aspect of L2 vertebral body, new from prior CT scan, suspicious for metastasis in this patient with prior history of breast cancer  - CT showed indeterminate heterogeneous mottled lucent lesion involving the L2 vertebral body  - increase Oxycontin   - c/w PRN oxycodone for breakthrough pain  - c/w lidocaine patch and flexeril. Will try fentanyl patch if pain remains uncontrolled  - Palliative care consult for pain management  - steroid burst trial  - Heme/Onc consult: appreciated     Leukocytosis  - most likely 2/2 underlying breast cancer     Anxiety  - c/w Xanax  - C/W lexapro 10 mg PO QHS  - Will consult psych as per patient's request     Constipation  - c/w Senna and Miralax   - add lactulose  - Improving. 2 small stools today     Prophylaxis:  DVT: heparin  GI: PO diet

## 2022-08-26 NOTE — DISCHARGE NOTE PROVIDER - NSDCMRMEDTOKEN_GEN_ALL_CORE_FT
acetaminophen 325 mg oral tablet: 2 tab(s) orally every 6 hours, As needed, Mild Pain (1 - 3), Moderate Pain (4 - 6)  ALPRAZolam 0.5 mg oral tablet: 1 tab(s) orally 3 times a day, As needed, anxiety  bisacodyl 5 mg oral delayed release tablet: 1 tab(s) orally every 12 hours, As needed, Constipation  cyclobenzaprine 5 mg oral tablet: 1 tab(s) orally 3 times a day, As needed, Muscle Spasm  dexamethasone 4 mg oral tablet: 1 tab(s) orally 2 times a day  escitalopram 10 mg oral tablet: 1 tab(s) orally once a day  lidocaine 4% topical film: Apply topically to affected area once a day  oxyCODONE 10 mg oral tablet: 1 tab(s) orally every 6 hours, As needed, Moderate Pain (4 - 6)  oxyCODONE 20 mg oral tablet, extended release: 1 tab(s) orally every 8 hours  polyethylene glycol 3350 oral powder for reconstitution: 17 gram(s) orally once a day  senna leaf extract oral tablet: 2 tab(s) orally once a day (at bedtime)

## 2022-08-26 NOTE — BH CONSULTATION LIAISON ASSESSMENT NOTE - NSBHCHARTREVIEWINVESTIGATE_PSY_A_CORE FT
CT Indeterminate heterogeneous mottled lucent lesion involving the L2 vertebral body. No gross pathologic fracture or bony retropulsion. Recommend further evaluation with a contrast-enhanced MRI study of the lumbar spine to evaluate this lesion, provided there are no contraindications to MRI.

## 2022-08-26 NOTE — PROGRESS NOTE ADULT - PROBLEM SELECTOR PLAN 3
follows with Dr. Mary Ugalde, breast surgeon at [facility]-last seen in the office in March 2022  reports she had breast cancer at 41 was on Tamoxifen.    Last year diagnosed with breast cancer again and had left partial mastectomy and surgical path report from 6/21 showed invasive moderately differentiated ductal carcinoma with margins clear except for inferior margin which showed tiny focus of ductal carcinoma in situ (less than 1mm size)  heme-onc consult appreciated   patient said if radiation were needed she would be amenable to that, but does not want chemo  concern for possible spinal mets on imaging-alk phos slightly elevated

## 2022-08-26 NOTE — PROGRESS NOTE ADULT - CONVERSATION DETAILS
Spoke with patient asking who would be her decision maker in the event she was unable.  She has a friend Alicia, but said "she is disabled". Discussed HCP and role of proxy if patient were incapacitated and offered to complete HCP form, but she does not want to at this time.  Further discussed MOLST form.  She said she had not thought about these things and is a bit overwhelmed by what is going on.  She said if radiation was needed she would be amenable to that, but does not want any chemo if it were needed.  Discussed importance of designating a decision maker via proxy and continuing to think about advanced directives.  Blank MOLST at patient's bedside to review.  Patient remains full code.

## 2022-08-26 NOTE — PROGRESS NOTE ADULT - PROBLEM SELECTOR PLAN 1
Still has episodes of back pain, but said it has improved   on oxycontin 20mg q 8 hours and oxycodone PRN available-no PRN doses in the last 24 hours taken   dexamethasone 4mg 2 times per day   maintain bowel regimen

## 2022-08-26 NOTE — PROGRESS NOTE ADULT - SUBJECTIVE AND OBJECTIVE BOX
SUBJECTIVE AND OBJECTIVE: Patient sleeping in bed, easily arousable  INTERVAL HPI/OVERNIGHT EVENTS:    DNR on chart: no  Allergies    No Known Allergies    Intolerances    MEDICATIONS  (STANDING):  dexAMETHasone     Tablet 4 milliGRAM(s) Oral two times a day  escitalopram 10 milliGRAM(s) Oral daily  heparin   Injectable 5000 Unit(s) SubCutaneous every 12 hours  lidocaine   4% Patch 1 Patch Transdermal every 24 hours  oxyCODONE  ER Tablet 20 milliGRAM(s) Oral every 8 hours  polyethylene glycol 3350 17 Gram(s) Oral daily  senna 2 Tablet(s) Oral at bedtime    MEDICATIONS  (PRN):  acetaminophen     Tablet .. 650 milliGRAM(s) Oral every 6 hours PRN Mild Pain (1 - 3), Moderate Pain (4 - 6)  ALPRAZolam 0.5 milliGRAM(s) Oral three times a day PRN anxiety  bisacodyl 5 milliGRAM(s) Oral every 12 hours PRN Constipation  cyclobenzaprine 5 milliGRAM(s) Oral three times a day PRN Muscle Spasm  oxyCODONE    IR 10 milliGRAM(s) Oral every 6 hours PRN Moderate Pain (4 - 6)      ITEMS UNCHECKED ARE NOT PRESENT    PRESENT SYMPTOMS: [ ]Unable to obtain due to poor mentation   Source if other than patient:  [ ]Family   [ ]Team     Pain:  [x ]yes [ ]no  QOL impact -   Location - lower back and legs               Aggravating factors -moving   Quality -  Radiation -  Timing-constant   Severity (0-10 scale):  Minimal acceptable level (0-10 scale):     Dyspnea:                           [ ]Mild [ ]Moderate [ ]Severe  Anxiety:                             [ ]Mild [ x]Moderate [ ]Severe  Fatigue:                             [ ]Mild [ ]Moderate [ ]Severe  Nausea:                             [ ]Mild [ ]Moderate [ ]Severe  Loss of appetite:              [ ]Mild [ ]Moderate [ ]Severe  Constipation:                    [ ]Mild [ ]Moderate [ ]Severe    PAIN AD Score:	  [email or web address] (Ctrl + left click to view)    Other Symptoms:  [x ]All other review of systems negative     Palliative Performance Status Version 2:       40  %      [email or web address]/files/[email or web address]_[email or web address]  PHYSICAL EXAM:  Vital Signs Last 24 Hrs  T(C): 36.4 (26 Aug 2022 11:47), Max: 36.6 (26 Aug 2022 00:03)  T(F): 97.6 (26 Aug 2022 11:47), Max: 97.8 (26 Aug 2022 00:03)  HR: 102 (26 Aug 2022 11:47) (88 - 123)  BP: 156/77 (26 Aug 2022 11:47) (117/67 - 162/92)  BP(mean): --  RR: 18 (26 Aug 2022 11:47) (18 - 18)  SpO2: 94% (26 Aug 2022 11:47) (94% - 95%)    Parameters below as of 26 Aug 2022 11:47  Patient On (Oxygen Delivery Method): room air     I&O's Summary    25 Aug 2022 07:01  -  26 Aug 2022 07:00  --------------------------------------------------------  IN: 250 mL / OUT: 0 mL / NET: 250 mL    26 Aug 2022 07:01  -  26 Aug 2022 12:09  --------------------------------------------------------  IN: 240 mL / OUT: 0 mL / NET: 240 mL       GENERAL:  [ x]Alert  [ ]Oriented x   [ ]Lethargic  [ ]Cachexia  [ ]Unarousable  [ x]Verbal  [ ]Non-Verbal  Behavioral:   [ ]Anxiety  [ ]Delirium [ ]Agitation [ ]Other  HEENT:  [x ]Normal   [ ]Dry mouth   [ ]ET Tube/Trach  [ ]Oral lesions  PULMONARY:   [x ]Clear [ ]Tachypnea  [ ]Audible excessive secretions   [ ]Rhonchi        [ ]Right [ ]Left [ ]Bilateral  [ ]Crackles        [ ]Right [ ]Left [ ]Bilateral  [ ]Wheezing     [ ]Right [ ]Left [ ]Bilateral  [ ]Diminished BS [ ] Right [ ]Left [ ]Bilateral  CARDIOVASCULAR:    [ ]Regular [ ]Irregular [x ]Tachy  [ ]Alexandre [ ]Murmur [ ]Other  GASTROINTESTINAL:  [ x]Soft  [ ]Distended   [ ]+BS  [ x]Non tender [ ]Tender  [ ]PEG [ ]OGT/ NGT   Last BM:  8/25/22 (per patient)  GENITOURINARY:  [ ]Normal [ x]Incontinent   [ ]Oliguria/Anuria   [ ]Ac  MUSCULOSKELETAL:   [ ]Normal   [x ]Weakness  [ ]Bed/Wheelchair bound [ ]Edema  NEUROLOGIC:   [ x]No focal deficits  [ ] Cognitive impairment  [ ] Dysphagia [ ]Dysarthria [ ] Paresis [ ]Other   SKIN:   [x ]Normal  [ ]Rash   [ ]Pressure ulcer(s) [ ]y [ ]n present on admission    CRITICAL CARE:  [ ]Shock Present  [ ]Septic [ ]Cardiogenic [ ]Neurologic [ ]Hypovolemic  [ ]Vasopressors [ ]Inotropes  [ ]Respiratory failure present [ ]Mechanical Ventilation [ ]Non-invasive ventilatory support [ ]High-Flow  [ ]Acute  [ ]Chronic [ ]Hypoxic  [ ]Hypercarbic [ ]Other  [ ]Other organ failure     LABS:                        13.4   18.08 )-----------( 331      ( 25 Aug 2022 07:35 )             42.9   08-25    139  |  108  |  60<H>  ----------------------------<  149<H>  4.6   |  24  |  0.94    Ca    10.2<H>      25 Aug 2022 07:35    RADIOLOGY & ADDITIONAL STUDIES: none new     Protein Calorie Malnutrition Present: [ ]mild [ ]moderate [ ]severe [ ]underweight [ ]morbid obesity  https://www.andeal.org/vault/2440/web/files/ONC/Table_Clinical%20Characteristics%20to%20Document%20Malnutrition-White%20JV%20et%20al%202012.pdf    Height (cm): 160 (08-19-22 @ 07:32)  Weight (kg): 77 (08-19-22 @ 16:50)  BMI (kg/m2): 30.1 (08-19-22 @ 16:50)    [ ]PPSV2 < or = 30%  [ ]significant weight loss [ ]poor nutritional intake [ ]anasarca    [ ]Artificial Nutrition    REFERRALS:   [ ]Chaplaincy  [ ]Hospice  [ ]Child Life  [ ]Social Work  [ ]Case management [ ]Holistic Therapy     Goals of Care Document:

## 2022-08-26 NOTE — DISCHARGE NOTE PROVIDER - ATTENDING DISCHARGE PHYSICAL EXAMINATION:
GENERAL: NAD, well-groomed, well-developed, anxious, tangentile   HEAD:  Atraumatic, Normocephalic  EYES: EOMI, PERRLA, conjunctiva and sclera clear  ENMT: No tonsillar erythema, exudates, or enlargement; Moist mucous membranes, Good dentition, No lesions  NECK: Supple, No JVD, Normal thyroid  NERVOUS SYSTEM:  Alert & Oriented X3, Poor concentration; Motor Strength 3/5 B/L upper and lower extremities; DTRs 2+ intact and symmetric  CHEST/LUNG: Clear to percussion bilaterally; No rales, rhonchi, wheezing, or rubs  HEART: Regular rate and rhythm; No murmurs, rubs, or gallops  ABDOMEN: Soft, Nontender, Nondistended; Bowel sounds present  EXTREMITIES:  2+ Peripheral Pulses, No clubbing, cyanosis, or edema  LYMPH: No lymphadenopathy noted  SKIN: No rashes or lesions

## 2022-08-26 NOTE — PROGRESS NOTE ADULT - SUBJECTIVE AND OBJECTIVE BOX
HPI:  76 year old woman with hx of back pain   spinal stenosis and osteoarthritis, h/o b/l  breast cancer  who presents with  h/o chronic lower back pain,  but over past 2 weeks has been getting worse.   .Lives alone, ambulates with walker     VSS  Exam comfortable hard of hearing   lungs CTA   [id number]  abdomen non tender  ext no edema

## 2022-08-26 NOTE — PROGRESS NOTE ADULT - SUBJECTIVE AND OBJECTIVE BOX
Patient is a 76y old  Female who presents with a chief complaint of c/c  back apin (25 Aug 2022 14:36)    INTERVAL HPI/OVERNIGHT EVENTS: Patients seen and examined at bedside this morning. No acute events overnight. Pt reports    MEDICATIONS  (STANDING):  dexAMETHasone     Tablet 4 milliGRAM(s) Oral two times a day  escitalopram 10 milliGRAM(s) Oral daily  heparin   Injectable 5000 Unit(s) SubCutaneous every 12 hours  lidocaine   4% Patch 1 Patch Transdermal every 24 hours  oxyCODONE  ER Tablet 20 milliGRAM(s) Oral every 8 hours  polyethylene glycol 3350 17 Gram(s) Oral daily  senna 2 Tablet(s) Oral at bedtime    MEDICATIONS  (PRN):  acetaminophen     Tablet .. 650 milliGRAM(s) Oral every 6 hours PRN Mild Pain (1 - 3), Moderate Pain (4 - 6)  ALPRAZolam 0.5 milliGRAM(s) Oral three times a day PRN anxiety  bisacodyl 5 milliGRAM(s) Oral every 12 hours PRN Constipation  cyclobenzaprine 5 milliGRAM(s) Oral three times a day PRN Muscle Spasm  oxyCODONE    IR 10 milliGRAM(s) Oral every 6 hours PRN Moderate Pain (4 - 6)    Allergies    No Known Allergies    Intolerances      REVIEW OF SYSTEMS:  All other systems reviewed and are negative    Vital Signs Last 24 Hrs  T(C): 36.4 (26 Aug 2022 05:32), Max: 36.8 (25 Aug 2022 11:40)  T(F): 97.6 (26 Aug 2022 05:32), Max: 98.2 (25 Aug 2022 11:40)  HR: 93 (26 Aug 2022 05:32) (88 - 123)  BP: 117/67 (26 Aug 2022 05:32) (117/67 - 162/92)  BP(mean): --  RR: 18 (26 Aug 2022 05:32) (18 - 18)  SpO2: 94% (26 Aug 2022 05:32) (92% - 95%)    Parameters below as of 25 Aug 2022 17:45  Patient On (Oxygen Delivery Method): room air      Daily     Daily   I&O's Summary    25 Aug 2022 07:01  -  26 Aug 2022 07:00  --------------------------------------------------------  IN: 250 mL / OUT: 0 mL / NET: 250 mL      CAPILLARY BLOOD GLUCOSE      POCT Blood Glucose.: 130 mg/dL (26 Aug 2022 07:36)    PHYSICAL EXAM:  GENERAL: NAD, well-groomed, well-developed  HEAD:  Atraumatic, Normocephalic  EYES: EOMI, PERRLA, conjunctiva and sclera clear  ENMT: No tonsillar erythema, exudates, or enlargement; Moist mucous membranes, Good dentition, No lesions  NECK: Supple, No JVD, Normal thyroid  NERVOUS SYSTEM:  Alert & Oriented X3, Good concentration; Motor Strength 5/5 B/L upper and lower extremities; DTRs 2+ intact and symmetric  CHEST/LUNG: Clear to percussion bilaterally; No rales, rhonchi, wheezing, or rubs  HEART: Regular rate and rhythm; No murmurs, rubs, or gallops  ABDOMEN: Soft, Nontender, Nondistended; Bowel sounds present  EXTREMITIES:  2+ Peripheral Pulses, No clubbing, cyanosis, or edema  LYMPH: No lymphadenopathy noted  SKIN: No rashes or lesions    Labs                          13.4   18.08 )-----------( 331      ( 25 Aug 2022 07:35 )             42.9     08-25    139  |  108  |  60<H>  ----------------------------<  149<H>  4.6   |  24  |  0.94    Ca    10.2<H>      25 Aug 2022 07:35                Culture - Blood (collected 23 Aug 2022 21:35)  Source: .Blood Blood-Peripheral  Preliminary Report (25 Aug 2022 09:02):    No growth to date.    Culture - Blood (collected 23 Aug 2022 21:25)  Source: .Blood Blood-Peripheral  Preliminary Report (25 Aug 2022 09:02):    No growth to date.

## 2022-08-29 LAB
CULTURE RESULTS: SIGNIFICANT CHANGE UP
CULTURE RESULTS: SIGNIFICANT CHANGE UP
SPECIMEN SOURCE: SIGNIFICANT CHANGE UP
SPECIMEN SOURCE: SIGNIFICANT CHANGE UP

## 2022-08-31 DIAGNOSIS — D72.829 ELEVATED WHITE BLOOD CELL COUNT, UNSPECIFIED: ICD-10-CM

## 2022-08-31 DIAGNOSIS — M48.00 SPINAL STENOSIS, SITE UNSPECIFIED: ICD-10-CM

## 2022-08-31 DIAGNOSIS — K59.00 CONSTIPATION, UNSPECIFIED: ICD-10-CM

## 2022-08-31 DIAGNOSIS — Z96.642 PRESENCE OF LEFT ARTIFICIAL HIP JOINT: ICD-10-CM

## 2022-08-31 DIAGNOSIS — R53.81 OTHER MALAISE: ICD-10-CM

## 2022-08-31 DIAGNOSIS — M54.9 DORSALGIA, UNSPECIFIED: ICD-10-CM

## 2022-08-31 DIAGNOSIS — R79.89 OTHER SPECIFIED ABNORMAL FINDINGS OF BLOOD CHEMISTRY: ICD-10-CM

## 2022-08-31 DIAGNOSIS — F41.9 ANXIETY DISORDER, UNSPECIFIED: ICD-10-CM

## 2022-08-31 DIAGNOSIS — C79.51 SECONDARY MALIGNANT NEOPLASM OF BONE: ICD-10-CM

## 2022-08-31 DIAGNOSIS — C50.919 MALIGNANT NEOPLASM OF UNSPECIFIED SITE OF UNSPECIFIED FEMALE BREAST: ICD-10-CM

## 2022-08-31 DIAGNOSIS — M54.16 RADICULOPATHY, LUMBAR REGION: ICD-10-CM

## 2022-08-31 DIAGNOSIS — Z90.710 ACQUIRED ABSENCE OF BOTH CERVIX AND UTERUS: ICD-10-CM

## 2023-03-20 ENCOUNTER — APPOINTMENT (OUTPATIENT)
Dept: SURGERY | Facility: CLINIC | Age: 78
End: 2023-03-20

## 2023-05-19 ENCOUNTER — EMERGENCY (EMERGENCY)
Facility: HOSPITAL | Age: 78
LOS: 0 days | Discharge: HOME | End: 2023-05-19
Attending: STUDENT IN AN ORGANIZED HEALTH CARE EDUCATION/TRAINING PROGRAM
Payer: MEDICARE

## 2023-05-19 VITALS
DIASTOLIC BLOOD PRESSURE: 75 MMHG | TEMPERATURE: 99 F | RESPIRATION RATE: 18 BRPM | OXYGEN SATURATION: 94 % | SYSTOLIC BLOOD PRESSURE: 147 MMHG | HEART RATE: 114 BPM

## 2023-05-19 VITALS — TEMPERATURE: 98 F | SYSTOLIC BLOOD PRESSURE: 126 MMHG | HEART RATE: 88 BPM | DIASTOLIC BLOOD PRESSURE: 75 MMHG

## 2023-05-19 DIAGNOSIS — M79.89 OTHER SPECIFIED SOFT TISSUE DISORDERS: ICD-10-CM

## 2023-05-19 DIAGNOSIS — M19.90 UNSPECIFIED OSTEOARTHRITIS, UNSPECIFIED SITE: ICD-10-CM
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
## 2023-05-19 DIAGNOSIS — S70.12XA CONTUSION OF LEFT THIGH, INITIAL ENCOUNTER: ICD-10-CM

## 2023-05-19 DIAGNOSIS — Z85.42 PERSONAL HISTORY OF MALIGNANT NEOPLASM OF OTHER PARTS OF UTERUS: ICD-10-CM

## 2023-05-19 DIAGNOSIS — Z92.3 PERSONAL HISTORY OF IRRADIATION: ICD-10-CM

## 2023-05-19 DIAGNOSIS — Z96.642 PRESENCE OF LEFT ARTIFICIAL HIP JOINT: Chronic | ICD-10-CM

## 2023-05-19 DIAGNOSIS — Z85.3 PERSONAL HISTORY OF MALIGNANT NEOPLASM OF BREAST: ICD-10-CM

## 2023-05-19 DIAGNOSIS — M79.605 PAIN IN LEFT LEG: ICD-10-CM
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
## 2023-05-19 PROCEDURE — 73552 X-RAY EXAM OF FEMUR 2/>: CPT | Mod: 26,LT

## 2023-05-19 PROCEDURE — 99284 EMERGENCY DEPT VISIT MOD MDM: CPT

## 2023-05-19 PROCEDURE — 73562 X-RAY EXAM OF KNEE 3: CPT | Mod: 26,LT

## 2023-05-19 NOTE — ED PROVIDER NOTE - NSFOLLOWUPINSTRUCTIONS_ED_ALL_ED_FT
Rest, drink plenty of fluids  Advance activity as tolerated  Continue all previously prescribed medications as directed  Follow up with your PMD - bring copies of your results  Return to the ER for severe pain, worsening symptoms, if you are unable to ambulate, or other new or concerning symptoms

## 2023-05-19 NOTE — ED PROVIDER NOTE - CARE PROVIDER_API CALL
Princess Blanchard (DO)  Orthopaedic Surgery Surgery  [address], [address]  [place] [address]  Phone: [phone]  Fax: [phone]  Follow Up Time:     Fernando Palma)  Orthopaedic Surgery  [address]  [place] [address]  Phone: [phone]  Fax: [phone]  Follow Up Time:

## 2023-05-19 NOTE — ED PROVIDER NOTE - PATIENT PORTAL LINK FT
You can access the FollowMyHealth Patient Portal offered by [facility] by registering at the following website: [email or web address]. By joining [facility]’s FollowMyHealth portal, you will also be able to view your health information using other applications (apps) compatible with our system.

## 2023-05-19 NOTE — ED PROVIDER NOTE - PHYSICAL EXAMINATION
General appearance: Nontoxic appearing, conversant, afebrile    Eyes: anicteric sclerae, SU, EOMI   HENT: Atraumatic; oropharynx clear, MMM and no ulcerations, no pharyngeal erythema or exudate   Neck: Trachea midline; Full range of motion, supple   Pulm: CTA bl, normal respiratory effort and no intercostal retractions, normal work of breathing   CV: RRR, No murmurs, rubs, or gallops   Extremities: No peripheral edema, no gross deformities, FROM x4, 5/5 MS x4, gross sensation intact, ambulatory   Skin: Dry, normal temperature, turgor and texture; no rash, 5cm area of healing ecchymosis over L lateral thigh   Psych: Appropriate affect, cooperative

## 2023-05-19 NOTE — ED PROVIDER NOTE - CLINICAL SUMMARY MEDICAL DECISION MAKING FREE TEXT BOX
Sent in for abnormal sonogram of LLE.  Reportedly with hematoma on sono which was worse on repeat.  No dvt.  Patient notes that since swelling has been improving and has minimal pain, is able to ambulate.  Small healing ecchymosis and swelling of lateral aspect of L thigh, no edema or pain elsewhere.  Given improving symptoms and minimal size of injury on exam 3 weeks after does not appear to warrant any acute intervention at this time.  No signs of infection.  Never had XR, will image and plan for dc.  Patient declined pain meds.

## 2023-05-19 NOTE — ED ADULT NURSE NOTE - OBJECTIVE STATEMENT
pt presents to the ED c/o LLE hip pain. per aid, pt had mechanical fall 3 weeks ago and had pain and swelling noted to L thigh. Pt went ot [facility] for sono and showed a hematoma. Pt had another sono done and showed increase in size so pt sent in for further eval. Hx breast cancer, currently on hormonal therapy. Pt able to ambulate with cane

## 2023-05-19 NOTE — ED PROVIDER NOTE - OBJECTIVE STATEMENT
76yo female with pmh breast ca on hormonal therapy presenting with LLE pain.  Patient had mechanical fall in home 3 weeks ago landing on L thigh.  Had pain and swelling starting after and went to pmd and had sono done which showed concern for hematoma.  A few days ago had second sono done noting increased size so sent to ED for further eval.  Patient denies pain unless she touches the area, is able to ambulate as normally does with cane.  Notes that swelling since has been improving.

## 2023-05-19 NOTE — ED ADULT TRIAGE NOTE - CHIEF COMPLAINT QUOTE
BIBEMS from home c/o left leg pain and swelling pt AAO x 3 reports a fall x 3 weeks ago as per pt her doctor called her and said to come in for possible draining

## 2023-05-19 NOTE — ED ADULT NURSE NOTE - NSFALLUNIVINTERV_ED_ALL_ED
Bed/Stretcher in lowest position, wheels locked, appropriate side rails in place/Call bell, personal items and telephone in reach/Instruct patient to call for assistance before getting out of bed/chair/stretcher/Non-slip footwear applied when patient is off stretcher/[place] to call system/Physically safe environment - no spills, clutter or unnecessary equipment/Purposeful proactive rounding/Room/bathroom lighting operational, light cord in reach

## 2023-05-20 NOTE — ED POST DISCHARGE NOTE - DETAILS
KAREEM Angulo: spoke with patient, reviewed XR findings, states she did not fall on knee and is not coming back to ER, recommended follow up with primary or oncologist as patient has bone cancer, strict return precautions provided, all questions answered

## 2023-07-11 NOTE — OCCUPATIONAL THERAPY INITIAL EVALUATION ADULT - GROOMING, PREVIOUS LEVEL OF FUNCTION, OT EVAL
Care Management Follow Up    Length of Stay (days): 12    Expected Discharge Date: 07/13/2023     Concerns to be Addressed:  Discharge planning     Patient plan of care discussed at interdisciplinary rounds: Yes    Anticipated Discharge Disposition: Skilled Nursing Facility, Transitional Care     Anticipated Discharge Services: therapy  Anticipated Discharge DME: None    Patient/family educated on Medicare website which has current facility and service quality ratings: yes  Education Provided on the Discharge Plan:  yes  Patient/Family in Agreement with the Plan: yes    Referrals Placed by CM/SW:  TCU referrals  Private pay costs discussed: Not applicable    Additional Information:  Received a call back from Nicky, the [employer] liaison.  She is asking if patient's dialysis is acute or chronic as it matters for billing purposes.  Discussed with Dr. Dc who will continue to look at patient throughout the week and try to make a decision on this by the end of the week.  Received a call back from [facility].  They have currently declined patient due to his oxygen needs, but will continue to follow patient and if his oxygen needs improve they will re-look at patient.  Received a message back from [facility].  They are continuing to follow patient's progress and will let us know if they feel they can meet patient's needs.    Will continue to follow.      FRANK Celis, [facility]    [phone]  [facility]     independent

## 2024-02-19 NOTE — ASU PATIENT PROFILE, ADULT - FALL HARM RISK CONCLUSION
Remote Patient Order Discontinued    Received request from Yuki Singh, RN  to discontinue order for remote patient monitoring of CHF and COPD and order completed.      Fall Risk

## 2024-10-17 NOTE — CONSULT NOTE ADULT - CONSULT REASON
medical evaluation
urinary retention, inability to insert Ac catheter
89-year-old female presents with fever.  PE as above.    Sepsis workup, likely admission.

## 2024-11-21 NOTE — BH CONSULTATION LIAISON ASSESSMENT NOTE - NSBHATTESTTYPEVISIT_PSY_A_CORE
Left message for patient to return call. Please reschedule to the week of 1/6-1/10/25 at [place].    Attending Only
